# Patient Record
Sex: MALE | Race: WHITE | Employment: OTHER | URBAN - METROPOLITAN AREA
[De-identification: names, ages, dates, MRNs, and addresses within clinical notes are randomized per-mention and may not be internally consistent; named-entity substitution may affect disease eponyms.]

---

## 2017-04-26 PROBLEM — J30.89 SEASONAL ALLERGIC RHINITIS DUE TO OTHER ALLERGIC TRIGGER: Status: ACTIVE | Noted: 2017-04-26

## 2017-04-26 PROBLEM — E66.09 NON MORBID OBESITY DUE TO EXCESS CALORIES: Status: ACTIVE | Noted: 2017-04-26

## 2017-04-26 PROBLEM — N52.9 ERECTILE DYSFUNCTION, UNSPECIFIED ERECTILE DYSFUNCTION TYPE: Status: ACTIVE | Noted: 2017-04-26

## 2017-04-26 PROBLEM — M19.039 ARTHRITIS, WRIST: Status: ACTIVE | Noted: 2017-04-26

## 2017-04-26 PROBLEM — R09.81 NASAL CONGESTION: Status: ACTIVE | Noted: 2017-04-26

## 2017-04-26 PROCEDURE — 86803 HEPATITIS C AB TEST: CPT | Performed by: INTERNAL MEDICINE

## 2017-04-26 PROCEDURE — 81003 URINALYSIS AUTO W/O SCOPE: CPT | Performed by: INTERNAL MEDICINE

## 2017-05-04 PROBLEM — M19.032: Status: ACTIVE | Noted: 2017-05-04

## 2017-05-04 PROBLEM — M19.031: Status: ACTIVE | Noted: 2017-05-04

## 2017-10-25 PROBLEM — Z98.890 H/O BILATERAL INGUINAL HERNIA REPAIR: Status: ACTIVE | Noted: 2017-10-25

## 2017-10-25 PROBLEM — Z87.19 H/O BILATERAL INGUINAL HERNIA REPAIR: Status: ACTIVE | Noted: 2017-10-25

## 2017-10-25 PROBLEM — M19.049 CMC ARTHRITIS: Status: ACTIVE | Noted: 2017-10-25

## 2018-04-26 PROCEDURE — 81003 URINALYSIS AUTO W/O SCOPE: CPT | Performed by: INTERNAL MEDICINE

## 2019-03-28 PROCEDURE — 81001 URINALYSIS AUTO W/SCOPE: CPT | Performed by: INTERNAL MEDICINE

## 2019-03-28 PROCEDURE — 87086 URINE CULTURE/COLONY COUNT: CPT | Performed by: INTERNAL MEDICINE

## 2019-05-06 PROCEDURE — 88305 TISSUE EXAM BY PATHOLOGIST: CPT | Performed by: INTERNAL MEDICINE

## 2020-03-30 PROBLEM — I45.10 RBBB: Status: ACTIVE | Noted: 2020-03-30

## 2020-03-30 PROBLEM — K21.9 GERD (GASTROESOPHAGEAL REFLUX DISEASE): Status: ACTIVE | Noted: 2020-03-30

## 2025-02-28 NOTE — CM/SW NOTE
SW was notified the pt. Has been pre-operatively arranged with [facility].    [facility] will continue to follow.      PARADISE Dougherty ext [phone]

## 2025-03-03 ENCOUNTER — NURSE ONLY (OUTPATIENT)
Dept: LAB | Age: 75
End: 2025-03-03
Attending: ORTHOPAEDIC SURGERY
Payer: MEDICARE

## 2025-03-03 DIAGNOSIS — Z01.818 PREOP TESTING: ICD-10-CM

## 2025-03-03 PROCEDURE — 87641 MR-STAPH DNA AMP PROBE: CPT

## 2025-03-04 LAB — MRSA DNA SPEC QL NAA+PROBE: NEGATIVE

## 2025-03-06 ENCOUNTER — ANESTHESIA EVENT (OUTPATIENT)
Dept: SURGERY | Facility: HOSPITAL | Age: 75
End: 2025-03-06
Payer: MEDICARE

## 2025-03-06 ENCOUNTER — HOSPITAL ENCOUNTER (OUTPATIENT)
Facility: HOSPITAL | Age: 75
Discharge: HOME HEALTH | End: 2025-03-09
Attending: ORTHOPAEDIC SURGERY | Admitting: ORTHOPAEDIC SURGERY
Payer: MEDICARE

## 2025-03-06 ENCOUNTER — ANESTHESIA (OUTPATIENT)
Dept: SURGERY | Facility: HOSPITAL | Age: 75
End: 2025-03-06
Payer: MEDICARE

## 2025-03-06 ENCOUNTER — APPOINTMENT (OUTPATIENT)
Dept: GENERAL RADIOLOGY | Facility: HOSPITAL | Age: 75
End: 2025-03-06
Attending: PHYSICIAN ASSISTANT
Payer: MEDICARE

## 2025-03-06 DIAGNOSIS — Z01.818 PREOP TESTING: Primary | ICD-10-CM

## 2025-03-06 DIAGNOSIS — Z96.651 S/P TOTAL KNEE ARTHROPLASTY, RIGHT: ICD-10-CM

## 2025-03-06 PROCEDURE — 97162 PT EVAL MOD COMPLEX 30 MIN: CPT

## 2025-03-06 PROCEDURE — 0SRC06A REPLACEMENT OF RIGHT KNEE JOINT WITH OXIDIZED ZIRCONIUM ON POLYETHYLENE SYNTHETIC SUBSTITUTE, UNCEMENTED, OPEN APPROACH: ICD-10-PCS | Performed by: ORTHOPAEDIC SURGERY

## 2025-03-06 PROCEDURE — 8E0Y0CZ ROBOTIC ASSISTED PROCEDURE OF LOWER EXTREMITY, OPEN APPROACH: ICD-10-PCS | Performed by: ORTHOPAEDIC SURGERY

## 2025-03-06 PROCEDURE — 73560 X-RAY EXAM OF KNEE 1 OR 2: CPT | Performed by: PHYSICIAN ASSISTANT

## 2025-03-06 PROCEDURE — 88305 TISSUE EXAM BY PATHOLOGIST: CPT | Performed by: ORTHOPAEDIC SURGERY

## 2025-03-06 PROCEDURE — 97165 OT EVAL LOW COMPLEX 30 MIN: CPT

## 2025-03-06 PROCEDURE — 64447 NJX AA&/STRD FEMORAL NRV IMG: CPT | Performed by: ORTHOPAEDIC SURGERY

## 2025-03-06 PROCEDURE — 97535 SELF CARE MNGMENT TRAINING: CPT

## 2025-03-06 PROCEDURE — 97530 THERAPEUTIC ACTIVITIES: CPT

## 2025-03-06 PROCEDURE — 97116 GAIT TRAINING THERAPY: CPT

## 2025-03-06 PROCEDURE — 88311 DECALCIFY TISSUE: CPT | Performed by: ORTHOPAEDIC SURGERY

## 2025-03-06 PROCEDURE — 64415 NJX AA&/STRD BRCH PLXS IMG: CPT | Performed by: ORTHOPAEDIC SURGERY

## 2025-03-06 DEVICE — IMPLANTABLE DEVICE: Type: IMPLANTABLE DEVICE | Site: KNEE | Status: FUNCTIONAL

## 2025-03-06 DEVICE — IMPLANTABLE DEVICE
Type: IMPLANTABLE DEVICE | Site: KNEE | Status: FUNCTIONAL
Brand: PERSONA® THE PERSONALIZED KNEE® OSSEOTI®

## 2025-03-06 DEVICE — IMPLANTABLE DEVICE
Type: IMPLANTABLE DEVICE | Site: KNEE | Status: FUNCTIONAL
Brand: PERSONA® VIVACIT-E®

## 2025-03-06 RX ORDER — ROPIVACAINE HYDROCHLORIDE 5 MG/ML
INJECTION, SOLUTION EPIDURAL; INFILTRATION; PERINEURAL
Status: COMPLETED | OUTPATIENT
Start: 2025-03-06 | End: 2025-03-06

## 2025-03-06 RX ORDER — OXYCODONE HYDROCHLORIDE 5 MG/1
5 TABLET ORAL EVERY 4 HOURS PRN
Status: DISPENSED | OUTPATIENT
Start: 2025-03-06 | End: 2025-03-07

## 2025-03-06 RX ORDER — MIDAZOLAM HYDROCHLORIDE 1 MG/ML
INJECTION INTRAMUSCULAR; INTRAVENOUS
Status: COMPLETED | OUTPATIENT
Start: 2025-03-06 | End: 2025-03-06

## 2025-03-06 RX ORDER — ASPIRIN 81 MG/1
81 TABLET ORAL 2 TIMES DAILY
Status: DISCONTINUED | OUTPATIENT
Start: 2025-03-06 | End: 2025-03-09

## 2025-03-06 RX ORDER — DIPHENHYDRAMINE HYDROCHLORIDE 50 MG/ML
12.5 INJECTION, SOLUTION INTRAMUSCULAR; INTRAVENOUS EVERY 4 HOURS PRN
Status: DISCONTINUED | OUTPATIENT
Start: 2025-03-06 | End: 2025-03-09

## 2025-03-06 RX ORDER — ONDANSETRON 2 MG/ML
INJECTION INTRAMUSCULAR; INTRAVENOUS AS NEEDED
Status: DISCONTINUED | OUTPATIENT
Start: 2025-03-06 | End: 2025-03-06 | Stop reason: SURG

## 2025-03-06 RX ORDER — BUPIVACAINE HYDROCHLORIDE 7.5 MG/ML
INJECTION, SOLUTION INTRASPINAL
Status: COMPLETED | OUTPATIENT
Start: 2025-03-06 | End: 2025-03-06

## 2025-03-06 RX ORDER — MORPHINE SULFATE 2 MG/ML
2 INJECTION, SOLUTION INTRAMUSCULAR; INTRAVENOUS EVERY 2 HOUR PRN
Status: ACTIVE | OUTPATIENT
Start: 2025-03-06 | End: 2025-03-07

## 2025-03-06 RX ORDER — HYDROMORPHONE HYDROCHLORIDE 1 MG/ML
0.6 INJECTION, SOLUTION INTRAMUSCULAR; INTRAVENOUS; SUBCUTANEOUS EVERY 5 MIN PRN
Status: DISCONTINUED | OUTPATIENT
Start: 2025-03-06 | End: 2025-03-06 | Stop reason: HOSPADM

## 2025-03-06 RX ORDER — CETIRIZINE HYDROCHLORIDE 10 MG/1
10 TABLET ORAL DAILY
Status: DISCONTINUED | OUTPATIENT
Start: 2025-03-07 | End: 2025-03-09

## 2025-03-06 RX ORDER — SODIUM PHOSPHATE, DIBASIC AND SODIUM PHOSPHATE, MONOBASIC 7; 19 G/230ML; G/230ML
1 ENEMA RECTAL ONCE AS NEEDED
Status: DISCONTINUED | OUTPATIENT
Start: 2025-03-06 | End: 2025-03-09

## 2025-03-06 RX ORDER — MORPHINE SULFATE 15 MG/1
15 TABLET ORAL EVERY 4 HOURS PRN
Status: ACTIVE | OUTPATIENT
Start: 2025-03-06 | End: 2025-03-07

## 2025-03-06 RX ORDER — DEXAMETHASONE SODIUM PHOSPHATE 4 MG/ML
VIAL (ML) INJECTION AS NEEDED
Status: DISCONTINUED | OUTPATIENT
Start: 2025-03-06 | End: 2025-03-06 | Stop reason: SURG

## 2025-03-06 RX ORDER — TRANEXAMIC ACID 650 MG/1
1300 TABLET ORAL ONCE
Status: COMPLETED | OUTPATIENT
Start: 2025-03-06 | End: 2025-03-06

## 2025-03-06 RX ORDER — MORPHINE SULFATE 4 MG/ML
6 INJECTION, SOLUTION INTRAMUSCULAR; INTRAVENOUS EVERY 2 HOUR PRN
Status: ACTIVE | OUTPATIENT
Start: 2025-03-06 | End: 2025-03-07

## 2025-03-06 RX ORDER — POLYETHYLENE GLYCOL 3350 17 G/17G
17 POWDER, FOR SOLUTION ORAL DAILY PRN
Status: DISCONTINUED | OUTPATIENT
Start: 2025-03-06 | End: 2025-03-09

## 2025-03-06 RX ORDER — EPHEDRINE SULFATE 50 MG/ML
INJECTION INTRAVENOUS AS NEEDED
Status: DISCONTINUED | OUTPATIENT
Start: 2025-03-06 | End: 2025-03-06 | Stop reason: SURG

## 2025-03-06 RX ORDER — HYDROMORPHONE HYDROCHLORIDE 1 MG/ML
0.2 INJECTION, SOLUTION INTRAMUSCULAR; INTRAVENOUS; SUBCUTANEOUS EVERY 5 MIN PRN
Status: DISCONTINUED | OUTPATIENT
Start: 2025-03-06 | End: 2025-03-06 | Stop reason: HOSPADM

## 2025-03-06 RX ORDER — NALOXONE HYDROCHLORIDE 0.4 MG/ML
0.08 INJECTION, SOLUTION INTRAMUSCULAR; INTRAVENOUS; SUBCUTANEOUS AS NEEDED
Status: ACTIVE | OUTPATIENT
Start: 2025-03-06 | End: 2025-03-06

## 2025-03-06 RX ORDER — SODIUM CHLORIDE, SODIUM LACTATE, POTASSIUM CHLORIDE, CALCIUM CHLORIDE 600; 310; 30; 20 MG/100ML; MG/100ML; MG/100ML; MG/100ML
INJECTION, SOLUTION INTRAVENOUS CONTINUOUS
Status: DISCONTINUED | OUTPATIENT
Start: 2025-03-06 | End: 2025-03-07

## 2025-03-06 RX ORDER — ACETAMINOPHEN 500 MG
1000 TABLET ORAL ONCE
Status: COMPLETED | OUTPATIENT
Start: 2025-03-06 | End: 2025-03-06

## 2025-03-06 RX ORDER — BISACODYL 10 MG
10 SUPPOSITORY, RECTAL RECTAL
Status: DISCONTINUED | OUTPATIENT
Start: 2025-03-06 | End: 2025-03-09

## 2025-03-06 RX ORDER — OXYCODONE HYDROCHLORIDE 5 MG/1
10 TABLET ORAL EVERY 4 HOURS PRN
Status: DISPENSED | OUTPATIENT
Start: 2025-03-06 | End: 2025-03-07

## 2025-03-06 RX ORDER — METOCLOPRAMIDE HYDROCHLORIDE 5 MG/ML
10 INJECTION INTRAMUSCULAR; INTRAVENOUS EVERY 8 HOURS PRN
Status: DISCONTINUED | OUTPATIENT
Start: 2025-03-06 | End: 2025-03-09

## 2025-03-06 RX ORDER — DEXAMETHASONE SODIUM PHOSPHATE 10 MG/ML
INJECTION, SOLUTION INTRAMUSCULAR; INTRAVENOUS
Status: COMPLETED | OUTPATIENT
Start: 2025-03-06 | End: 2025-03-06

## 2025-03-06 RX ORDER — MORPHINE SULFATE 4 MG/ML
2 INJECTION, SOLUTION INTRAMUSCULAR; INTRAVENOUS EVERY 10 MIN PRN
Status: DISCONTINUED | OUTPATIENT
Start: 2025-03-06 | End: 2025-03-06 | Stop reason: HOSPADM

## 2025-03-06 RX ORDER — BUPIVACAINE HYDROCHLORIDE 7.5 MG/ML
INJECTION, SOLUTION INTRASPINAL AS NEEDED
Status: DISCONTINUED | OUTPATIENT
Start: 2025-03-06 | End: 2025-03-06 | Stop reason: SURG

## 2025-03-06 RX ORDER — DIPHENHYDRAMINE HYDROCHLORIDE 50 MG/ML
25 INJECTION, SOLUTION INTRAMUSCULAR; INTRAVENOUS ONCE AS NEEDED
Status: ACTIVE | OUTPATIENT
Start: 2025-03-06 | End: 2025-03-06

## 2025-03-06 RX ORDER — DIPHENHYDRAMINE HCL 25 MG
25 CAPSULE ORAL EVERY 4 HOURS PRN
Status: DISCONTINUED | OUTPATIENT
Start: 2025-03-06 | End: 2025-03-09

## 2025-03-06 RX ORDER — HYDROMORPHONE HYDROCHLORIDE 1 MG/ML
0.4 INJECTION, SOLUTION INTRAMUSCULAR; INTRAVENOUS; SUBCUTANEOUS EVERY 5 MIN PRN
Status: DISCONTINUED | OUTPATIENT
Start: 2025-03-06 | End: 2025-03-06 | Stop reason: HOSPADM

## 2025-03-06 RX ORDER — SENNOSIDES 8.6 MG
17.2 TABLET ORAL NIGHTLY
Status: DISCONTINUED | OUTPATIENT
Start: 2025-03-06 | End: 2025-03-09

## 2025-03-06 RX ORDER — FAMOTIDINE 20 MG/1
20 TABLET, FILM COATED ORAL 2 TIMES DAILY
Status: DISCONTINUED | OUTPATIENT
Start: 2025-03-06 | End: 2025-03-09

## 2025-03-06 RX ORDER — MORPHINE SULFATE 4 MG/ML
4 INJECTION, SOLUTION INTRAMUSCULAR; INTRAVENOUS EVERY 2 HOUR PRN
Status: ACTIVE | OUTPATIENT
Start: 2025-03-06 | End: 2025-03-07

## 2025-03-06 RX ORDER — ACETAMINOPHEN 500 MG
1000 TABLET ORAL EVERY 8 HOURS
Status: DISPENSED | OUTPATIENT
Start: 2025-03-06 | End: 2025-03-07

## 2025-03-06 RX ORDER — LIDOCAINE HYDROCHLORIDE 10 MG/ML
INJECTION, SOLUTION EPIDURAL; INFILTRATION; INTRACAUDAL; PERINEURAL AS NEEDED
Status: DISCONTINUED | OUTPATIENT
Start: 2025-03-06 | End: 2025-03-06 | Stop reason: SURG

## 2025-03-06 RX ORDER — MIDAZOLAM HYDROCHLORIDE 1 MG/ML
INJECTION INTRAMUSCULAR; INTRAVENOUS AS NEEDED
Status: DISCONTINUED | OUTPATIENT
Start: 2025-03-06 | End: 2025-03-06 | Stop reason: SURG

## 2025-03-06 RX ORDER — DOCUSATE SODIUM 100 MG/1
100 CAPSULE, LIQUID FILLED ORAL 2 TIMES DAILY
Status: DISCONTINUED | OUTPATIENT
Start: 2025-03-06 | End: 2025-03-09

## 2025-03-06 RX ORDER — ONDANSETRON 2 MG/ML
4 INJECTION INTRAMUSCULAR; INTRAVENOUS EVERY 6 HOURS PRN
Status: DISCONTINUED | OUTPATIENT
Start: 2025-03-06 | End: 2025-03-09

## 2025-03-06 RX ORDER — MORPHINE SULFATE 10 MG/ML
6 INJECTION, SOLUTION INTRAMUSCULAR; INTRAVENOUS EVERY 10 MIN PRN
Status: DISCONTINUED | OUTPATIENT
Start: 2025-03-06 | End: 2025-03-06 | Stop reason: HOSPADM

## 2025-03-06 RX ORDER — LIDOCAINE HYDROCHLORIDE 10 MG/ML
INJECTION, SOLUTION INFILTRATION; PERINEURAL
Status: COMPLETED | OUTPATIENT
Start: 2025-03-06 | End: 2025-03-06

## 2025-03-06 RX ORDER — HYDROCODONE BITARTRATE AND ACETAMINOPHEN 10; 325 MG/1; MG/1
1 TABLET ORAL EVERY 6 HOURS PRN
Status: DISCONTINUED | OUTPATIENT
Start: 2025-03-06 | End: 2025-03-09

## 2025-03-06 RX ORDER — FAMOTIDINE 10 MG/ML
20 INJECTION, SOLUTION INTRAVENOUS 2 TIMES DAILY
Status: DISCONTINUED | OUTPATIENT
Start: 2025-03-06 | End: 2025-03-09

## 2025-03-06 RX ORDER — MORPHINE SULFATE 4 MG/ML
4 INJECTION, SOLUTION INTRAMUSCULAR; INTRAVENOUS EVERY 10 MIN PRN
Status: DISCONTINUED | OUTPATIENT
Start: 2025-03-06 | End: 2025-03-06 | Stop reason: HOSPADM

## 2025-03-06 RX ORDER — FLUTICASONE PROPIONATE 50 MCG
2 SPRAY, SUSPENSION (ML) NASAL DAILY
Status: DISCONTINUED | OUTPATIENT
Start: 2025-03-07 | End: 2025-03-09

## 2025-03-06 RX ADMIN — ROPIVACAINE HYDROCHLORIDE 30 ML: 5 INJECTION, SOLUTION EPIDURAL; INFILTRATION; PERINEURAL at 06:59:00

## 2025-03-06 RX ADMIN — EPHEDRINE SULFATE 10 MG: 50 INJECTION INTRAVENOUS at 08:16:00

## 2025-03-06 RX ADMIN — DEXAMETHASONE SODIUM PHOSPHATE 4 MG: 4 MG/ML VIAL (ML) INJECTION at 07:52:00

## 2025-03-06 RX ADMIN — MIDAZOLAM HYDROCHLORIDE 1 MG: 1 INJECTION INTRAMUSCULAR; INTRAVENOUS at 07:17:00

## 2025-03-06 RX ADMIN — DEXAMETHASONE SODIUM PHOSPHATE 10 MG: 10 INJECTION, SOLUTION INTRAMUSCULAR; INTRAVENOUS at 06:59:00

## 2025-03-06 RX ADMIN — BUPIVACAINE HYDROCHLORIDE 1.5 ML: 7.5 INJECTION, SOLUTION INTRASPINAL at 07:15:00

## 2025-03-06 RX ADMIN — ONDANSETRON 4 MG: 2 INJECTION INTRAMUSCULAR; INTRAVENOUS at 07:39:00

## 2025-03-06 RX ADMIN — SODIUM CHLORIDE, SODIUM LACTATE, POTASSIUM CHLORIDE, CALCIUM CHLORIDE: 600; 310; 30; 20 INJECTION, SOLUTION INTRAVENOUS at 07:55:00

## 2025-03-06 RX ADMIN — SODIUM CHLORIDE, SODIUM LACTATE, POTASSIUM CHLORIDE, CALCIUM CHLORIDE: 600; 310; 30; 20 INJECTION, SOLUTION INTRAVENOUS at 08:43:00

## 2025-03-06 RX ADMIN — LIDOCAINE HYDROCHLORIDE 5 ML: 10 INJECTION, SOLUTION INFILTRATION; PERINEURAL at 07:15:00

## 2025-03-06 RX ADMIN — SODIUM CHLORIDE, SODIUM LACTATE, POTASSIUM CHLORIDE, CALCIUM CHLORIDE: 600; 310; 30; 20 INJECTION, SOLUTION INTRAVENOUS at 07:56:00

## 2025-03-06 RX ADMIN — MIDAZOLAM HYDROCHLORIDE 2 MG: 1 INJECTION INTRAMUSCULAR; INTRAVENOUS at 06:59:00

## 2025-03-06 RX ADMIN — LIDOCAINE HYDROCHLORIDE 25 MG: 10 INJECTION, SOLUTION EPIDURAL; INFILTRATION; INTRACAUDAL; PERINEURAL at 07:39:00

## 2025-03-06 RX ADMIN — MIDAZOLAM HYDROCHLORIDE 1 MG: 1 INJECTION INTRAMUSCULAR; INTRAVENOUS at 07:15:00

## 2025-03-06 NOTE — HOME CARE LIAISON
This patient was set up pre-operatively with [facility]. Met with pt and wife, all questions answered. [facility] will follow at [place] for RN/PT. TERRI Cori aware.

## 2025-03-06 NOTE — ANESTHESIA PREPROCEDURE EVALUATION
Anesthesia PreOp Note    HPI:     Luis Antonio Pavon is a 74 year old male who presents for preoperative consultation requested by: Angel Smith MD    Date of Surgery: 3/6/2025    Procedure(s):  Right total knee arthroplasty  Indication: Primary osteoarthritis of right knee    Relevant Problems   No relevant active problems       NPO:  Last Liquid Consumption Date: 03/05/25  Last Liquid Consumption Time: 2200  Last Solid Consumption Date: 03/05/25  Last Solid Consumption Time: 1800  Last Liquid Consumption Date: 03/05/25          History Review:  Patient Active Problem List    Diagnosis Date Noted    GERD (gastroesophageal reflux disease) 03/30/2020    RBBB 03/30/2020    CMC arthritis 10/25/2017    Osteoarthritis of both wrists, unspecified osteoarthritis type 05/04/2017    Nasal congestion 04/26/2017    Erectile dysfunction, unspecified erectile dysfunction type 04/26/2017    Seasonal allergic rhinitis due to other allergic trigger 04/26/2017    Non morbid obesity due to excess calories 04/26/2017    Hx of colonic polyp 04/25/2016    Nocturia 04/07/2015    Low HDL (under 40) 03/27/2014       Past Medical History:    Cancer (HCC)    skin cancer    Chondromalacia of patella    Log Date: 02/18/2013     Colon polyp    Dislocation of shoulder    bilat mult times, sees ortho prn    ED (erectile dysfunction)    GERD (gastroesophageal reflux disease)    uses tums 2-3 times weekly, fermin after acidic foods. no trouble swallowing    Hematuria    work up by Dr. Caraballo negative    Hemorrhoids    better with surgery    History of bilateral inguinal hernia repair    saw Sapna, surgery fall 2016    Living will in place    Low HDL (under 40)    Nasal congestion    saw ENT    Need for hepatitis C screening test    neg    Normal eye exam    at [facility], will go 2021    Osteoarthritis    RBBB    new from 2010, asyptomatic, no further w/u indicated,  discussed with pt ( was nl 2/22/10)    Screening for cardiovascular condition    normal  CGXT with EF of 52%    Screening for skin condition    Dr. Sena, sees yearly       Past Surgical History:   Procedure Laterality Date    Cataract      Colonoscopy  2014    Procedure: COLONOSCOPY, POSSIBLE BIOPSY, POSSIBLE POLYPECTOMY 19839;  Surgeon: Nathan Gibbs MD;  Location: [facility]    Colonoscopy N/A 2019    Procedure: COLONOSCOPY, POSSIBLE BIOPSY, POSSIBLE POLYPECTOMY 04229;  Surgeon: Nathan Gibbs MD;  Location: [facility]    Colonoscopy,diagnostic  , , , 14    neg Dr. Gibbs, recheck  due to hx    Hemorrhoidectomy,int/ext,complx  2009    Performed by CHUCK ALEJO at [facility]    Hernia surgery Bilateral 2016    Dr. Alejo    Other surgical history Left 1971    shoulder surg    Repair ing hernia,4+y/o,reducibl Left 1965    L inguinal    Repair of nasal septum  ,     Tonsillectomy      Umbilical hernia repair  2019    Dr. Alejo       Prescriptions Prior to Admission[1]  Current Medications and Prescriptions Ordered in Epic[2]    Allergies[3]    Family History   Problem Relation Age of Onset    Heart Disorder Father         cardiomyopathy? ICD    Heart Disorder Brother         murmur    Psychiatric Mother         memory issues    No Known Problems Daughter     No Known Problems Daughter     Cancer Daughter 30        melanoma    No Known Problems Brother      Social History     Socioeconomic History    Marital status:     Number of children: 3   Occupational History    Occupation: health care consultant,retired   Tobacco Use    Smoking status: Former     Current packs/day: 0.00     Average packs/day: 0.8 packs/day for 10.0 years (7.5 ttl pk-yrs)     Types: Cigarettes     Start date: 1975     Quit date: 1985     Years since quittin.2    Smokeless tobacco: Never   Vaping Use    Vaping status: Never Used   Substance and Sexual Activity    Alcohol use: Yes     Alcohol/week: 2.5 standard  drinks of alcohol     Types: 3 Standard drinks or equivalent per week     Comment: 3 x weekly    Drug use: No    Sexual activity: Not Currently     Partners: Female   Other Topics Concern     Service No    Blood Transfusions No    Exercise Yes     Comment: walking due to covid 4/12/21    Seat Belt Yes    Self-Exams Yes       Available pre-op labs reviewed.             Vital Signs:  Body mass index is 29.66 kg/m².   height is 1.88 m (6' 2\") and weight is 104.8 kg (231 lb). His oral temperature is 97.8 °F (36.6 °C). His blood pressure is 132/80 and his pulse is 76. His respiration is 16 and oxygen saturation is 94%.   Vitals:    02/28/25 1151 03/06/25 0604   BP:  132/80   Pulse:  76   Resp:  16   Temp:  97.8 °F (36.6 °C)   TempSrc:  Oral   SpO2:  94%   Weight: 103 kg (227 lb) 104.8 kg (231 lb)   Height: 1.88 m (6' 2\")         Anesthesia Evaluation     Patient summary reviewed and Nursing notes reviewed    No history of anesthetic complications   Airway   Mallampati: II  TM distance: >3 FB  Neck ROM: full  Dental - Dentition appears grossly intact     Pulmonary - negative ROS and normal exam   Cardiovascular - negative ROS and normal exam    Neuro/Psych - negative ROS     GI/Hepatic/Renal    (+) GERD    Endo/Other - negative ROS   Abdominal                  Anesthesia Plan:   ASA:  2  Plan:   Spinal and regional  Post-op Pain Management: Saphenous block  Plan Comments: , no blood thinners -- proceed adductor/plain spinal  Informed Consent Plan and Risks Discussed With:  Patient  Discussed plan with:  CRNA      I have informed Luis Antonio PULIDO Jonny and/or legal guardian or family member of the nature of the anesthetic plan, benefits, risks including possible dental damage if relevant, major complications, and any alternative forms of anesthetic management.   All of the patient's questions were answered to the best of my ability. The patient desires the anesthetic management as planned.  Ganga Rocha,  MD  3/6/2025 6:36 AM  Present on Admission:  **None**           [1]   Medications Prior to Admission   Medication Sig Dispense Refill Last Dose/Taking    Cholecalciferol (VITAMIN D) 50 MCG (2000 UT) Oral Cap    Past Week    Saccharomyces boulardii (PROBIOTIC) 250 MG Oral Cap    Past Week    Polypodium Leucotomos (HELIOCARE OR) Take 1 tablet by mouth daily.   Past Week    Fluticasone Propionate (FLONASE) 50 MCG/ACT Nasal Suspension SPRAY 2 SPRAYS IN EACH NOSTRIL EVERY DAY 48 g 3 3/5/2025    Multiple Vitamins-Minerals (OCUVITE ADULT 50+ OR) Take  by mouth daily.   Past Week    Cetirizine HCl (ZYRTEC) 10 MG Oral Tab 1 TABLET DAILY   3/4/2025    COSAMIN -400 MG OR CAPS 2 capsules daily   Past Week    ketoconazole 2 % External Cream Apply to rash between toes BID 60 g 3    [2]   Current Facility-Administered Medications Ordered in Epic   Medication Dose Route Frequency Provider Last Rate Last Admin    lactated ringers infusion   Intravenous Continuous Angel Smith MD        ceFAZolin (Ancef) 2g in 10mL IV syringe premix  2 g Intravenous Once Angel Smith MD        clonidine-EPINEPHrine-ropivacaine-ketorolac (CERTS) (Duraclon-Adrenalin-Naropin-Toradol) pain cocktail irrigation   Intra-articular Once (Intra-Op) Angel Smith MD         No current [facility]-ordered outpatient medications on file.   [3] No Known Allergies

## 2025-03-06 NOTE — ANESTHESIA PROCEDURE NOTES
Peripheral Block    Date/Time: 3/6/2025 6:59 AM    Performed by: Ganga Rocha MD  Authorized by: Ganga Rocha MD      General Information and Staff    Start Time:  3/6/2025 6:59 AM  End Time:  3/6/2025 7:03 AM  Anesthesiologist:  Ganga Rocha MD  Performed by:  Anesthesiologist  Patient Location:  PACU    Block Placement: Pre Induction  Site Identification: real time ultrasound guided and image stored and retrievable    Block site/laterality marked before start: site marked  Reason for Block: at surgeon's request and post-op pain management    Preanesthetic Checklist: 2 patient identifers, IV checked, risks and benefits discussed, monitors and equipment checked, pre-op evaluation, timeout performed, anesthesia consent, sterile technique used, no prohibitive neurological deficits and no local skin infection at insertion site      Procedure Details    Patient Position:  Supine  Prep: ChloraPrep    Monitoring:  Heart rate, cardiac monitor, continuous pulse ox and blood pressure cuff  Block Type:  Adductor canal  Laterality:  Right  Injection Technique:  Single-shot    Needle    Needle Type:  Short-bevel and echogenic  Needle Gauge:  21 G  Needle Length:  90 mm  Needle Localization:  Ultrasound guidance  Reason for Ultrasound Use: appropriate spread of the medication was noted in real time and no ultrasound evidence of intravascular and/or intraneural injection            Assessment    Injection Assessment:  Good spread noted, incremental injection, low pressure, negative aspiration for heme, negative resistance and no pain on injection  - Patient tolerated block procedure well without evidence of immediate block related complications.     Medications  3/6/2025 6:59 AM  midazolam (VERSED)injection 2mg/2ml - Intravenous   2 mg - 3/6/2025 6:59:00 AM  ropivacaine (NAROPIN) injection 0.5% - Infiltration   30 mL - 3/6/2025 6:59:00 AM  dexamethasone (DECADRON) PF injection 10 mg/ml - Injection   10  mg - 3/6/2025 6:59:00 AM    Additional Comments    Good image, atraumatic

## 2025-03-06 NOTE — DISCHARGE INSTRUCTIONS
Home Health:  Sometimes managing your health at home requires assistance.  The [facility] team has recognized your preference to use [facility].  They can be reached by phone at [phone].  The fax number for your reference is [phone].  A representative from the home health agency will contact you or your family to schedule your first visit.        Keep dressing intact for 1 week, changing only if >50% saturated  Change dressing in 1 week if not previously changed  May shower with water-proof dressing intact  Keep leg elevated when not ambulating, no pillow directly under knee, keep leg straight with foot higher than knee.  Use pain medication as provided  Use over the counter stool softener daily while taking pain medication - Colace 100mg twice a day AND Senokot 17.2mg every night. If no bowel movement in 2 days, obtain Magnesium Citrate and take as directed.  Start 81mg Aspirin tonight with dinner. Continue 81mg twice a day for 6 weeks.  Contact number provided in 24 hours if you have not heard from home health services for RN and physical therapy home visits  Follow-up in office in 2 weeks as scheduled       Sometimes managing your health at home requires assistance.  The [facility] team has recognized your preference to use [facility].  They can be reached by phone at [phone].  The fax number for your reference is [phone].  A representative from the home health agency will contact you or your family to schedule your first visit.          HOME INSTRUCTIONS  AMBS[facility]G HOME CARE INSTRUCTIONS: POST-OP ANESTHESIA  The medication that you received for sedation or general anesthesia can last up to 24 hours. Your judgment and reflexes may be altered, even if you feel like your normal self.      We Recommend:   Do not drive any motor vehicle or bicycle   Avoid mowing the lawn, playing sports, or working with power tools/applicances (power saws,  electric knives or mixers)   That you have someone stay with you on your first night home   Do not drink alcohol or take sleeping pills or tranquilizers   Do not sign legal documents within 24 hours of your procedure   If you had a nerve block for your surgery, take extra care not to put any pressure on your arm or hand for 24 hours    It is normal:  For you to have a sore throat if you had a breathing tube during surgery (while you were asleep!). The sore throat should get better within 48 hours. You can gargle with warm salt water (1/2 tsp in 4 oz warm water) or use a throat lozenge for comfort  To feel muscle aches or soreness especially in the abdomen, chest or neck. The achy feeling should go away in the next 24 hours  To feel weak, sleepy or \"wiped out\". Your should start feeling better in the next 24 hours.   To experience mild discomforts such as sore lip or tongue, headache, cramps, gas pains or a bloated feeling in your abdomen.   To experience mild back pain or soreness for a day or two if you had spinal or epidural anesthesia.   If you had laparoscopic surgery, to feel shoulder pain or discomfort on the day of surgery.   For some patients to have nausea after surgery/anesthesia    If you feel nausea or experience vomiting:   Try to move around less.   Eat less than usual or drink only liquids until the next morning   Nausea should resolve in about 24 hours    If you have a problem when you are at home:    Call your surgeons office   Discharge Instructions: After Your Surgery  You’ve just had surgery. During surgery, you were given medicine called anesthesia to keep you relaxed and free of pain. After surgery, you may have some pain or nausea. This is common. Here are some tips for feeling better and getting well after surgery.   Going home  Your healthcare provider will show you how to take care of yourself when you go home. They'll also answer your questions. Have an adult family member or friend drive  you home. For the first 24 hours after your surgery:   Don't drive or use heavy equipment.  Don't make important decisions or sign legal papers.  Take medicines as directed.  Don't drink alcohol.  Have someone stay with you, if needed. They can watch for problems and help keep you safe.  Be sure to go to all follow-up visits with your healthcare provider. And rest after your surgery for as long as your provider tells you to.   Coping with pain  If you have pain after surgery, pain medicine will help you feel better. Take it as directed, before pain becomes severe. Also, ask your healthcare provider or pharmacist about other ways to control pain. This might be with heat, ice, or relaxation. And follow any other instructions your surgeon or nurse gives you.      Stay on schedule with your medicine.     Tips for taking pain medicine  To get the best relief possible, remember these points:   Pain medicines can upset your stomach. Taking them with a little food may help.  Most pain relievers taken by mouth need at least 20 to 30 minutes to start to work.  Don't wait till your pain becomes severe before you take your medicine. Try to time your medicine so that you can take it before starting an activity. This might be before you get dressed, go for a walk, or sit down for dinner.  Constipation is a common side effect of some pain medicines. Call your healthcare provider before taking any medicines such as laxatives or stool softeners to help ease constipation. Also ask if you should skip any foods. Drinking lots of fluids and eating foods such as fruits and vegetables that are high in fiber can also help. Remember, don't take laxatives unless your surgeon has prescribed them.  Drinking alcohol and taking pain medicine can cause dizziness and slow your breathing. It can even be deadly. Don't drink alcohol while taking pain medicine.  Pain medicine can make you react more slowly to things. Don't drive or run machinery while  taking pain medicine.  Your healthcare provider may tell you to take acetaminophen to help ease your pain. Ask them how much you're supposed to take each day. Acetaminophen or other pain relievers may interact with your prescription medicines or other over-the-counter (OTC) medicines. Some prescription medicines have acetaminophen and other ingredients in them. Using both prescription and OTC acetaminophen for pain can cause you to accidentally overdose. Read the labels on your OTC medicines with care. This will help you to clearly know the list of ingredients, how much to take, and any warnings. It may also help you not take too much acetaminophen. If you have questions or don't understand the information, ask your pharmacist or healthcare provider to explain it to you before you take the OTC medicine.   Managing nausea  Some people have an upset stomach (nausea) after surgery. This is often because of anesthesia, pain, or pain medicine, less movement of food in the stomach, or the stress of surgery. These tips will help you handle nausea and eat healthy foods as you get better. If you were on a special food plan before surgery, ask your healthcare provider if you should follow it while you get better. Check with your provider on how your eating should progress. It may depend on the surgery you had. These general tips may help:   Don't push yourself to eat. Your body will tell you when to eat and how much.  Start off with clear liquids and soup. They're easier to digest.  Next try semi-solid foods as you feel ready. These include mashed potatoes, applesauce, and gelatin.  Slowly move to solid foods. Don’t eat fatty, rich, or spicy foods at first.  Don't force yourself to have 3 large meals a day. Instead eat smaller amounts more often.  Take pain medicines with a small amount of solid food, such as crackers or toast. This helps prevent nausea.  When to call your healthcare provider  Call your healthcare provider  right away if you have any of these:   You still have too much pain, or the pain gets worse, after taking the medicine. The medicine may not be strong enough. Or there may be a complication from the surgery.  You feel too sleepy, dizzy, or groggy. The medicine may be too strong.  Side effects such as nausea or vomiting. Your healthcare provider may advise taking other medicines to .  Skin changes such as rash, itching, or hives. This may mean you have an allergic reaction. Your provider may advise taking other medicines.  The incision looks different (for instance, part of it opens up).  Bleeding or fluid leaking from the incision site, and weren't told to expect that.  Fever of 100.4°F (38°C) or higher, or as directed by your provider.  Call 911  Call 911 right away if you have:   Trouble breathing  Facial swelling    If you have obstructive sleep apnea   You were given anesthesia medicine during surgery to keep you comfortable and free of pain. After surgery, you may have more apnea spells because of this medicine and other medicines you were given. The spells may last longer than normal.    At home:  Keep using the continuous positive airway pressure (CPAP) device when you sleep. Unless your healthcare provider tells you not to, use it when you sleep, day or night. CPAP is a common device used to treat obstructive sleep apnea.  Talk with your provider before taking any pain medicine, muscle relaxants, or sedatives. Your provider will tell you about the possible dangers of taking these medicines.  Contact your provider if your sleeping changes a lot even when taking medicines as directed.  i[employer] last reviewed this educational content on 10/1/2021  © 9959-7618 [employer]. All rights reserved. This information is not intended as a substitute for professional medical care. Always follow your healthcare professional's instructions.

## 2025-03-06 NOTE — H&P
History & Physical Examination    Patient Name: Luis Antonio Pavon  MRN: [id number]  CSN: [id number]  YOB: 1950    Diagnosis: RIGHT KNEE OA    Present Illness: FAILED CONSERVATIVE MEASURES    Prescriptions Prior to Admission[1]  Current Facility-Administered Medications   Medication Dose Route Frequency    lactated ringers infusion   Intravenous Continuous    ceFAZolin (Ancef) 2g in 10mL IV syringe premix  2 g Intravenous Once    clonidine-EPINEPHrine-ropivacaine-ketorolac (CERTS) (Duraclon-Adrenalin-Naropin-Toradol) pain cocktail irrigation   Intra-articular Once (Intra-Op)       Allergies: Allergies[2]    Past Medical History:    Cancer (HCC)    skin cancer    Chondromalacia of patella    Log Date: 02/18/2013     Colon polyp    Dislocation of shoulder    bilat mult times, sees ortho prn    ED (erectile dysfunction)    GERD (gastroesophageal reflux disease)    uses tums 2-3 times weekly, fermin after acidic foods. no trouble swallowing    Hematuria    work up by Dr. Caraballo negative    Hemorrhoids    better with surgery    History of bilateral inguinal hernia repair    saw Sapna, surgery fall 2016    Living will in place    Low HDL (under 40)    Nasal congestion    saw ENT    Need for hepatitis C screening test    neg    Normal eye exam    at [facility], will go 2021    Osteoarthritis    RBBB    new from 2010, asyptomatic, no further w/u indicated,  discussed with pt ( was nl 2/22/10)    Screening for cardiovascular condition    normal CGXT with EF of 52%    Screening for skin condition    Dr. Sena, sees yearly     Past Surgical History:   Procedure Laterality Date    Cataract      Colonoscopy  05/05/2014    Procedure: COLONOSCOPY, POSSIBLE BIOPSY, POSSIBLE POLYPECTOMY 85579;  Surgeon: Nathan Gibbs MD;  Location: [facility]    Colonoscopy N/A 05/06/2019    Procedure: COLONOSCOPY, POSSIBLE BIOPSY, POSSIBLE POLYPECTOMY 51965;  Surgeon: Nathan Gibbs MD;  Location: [facility]     Colonoscopy,diagnostic  , , , 14    neg Dr. Gibbs, recheck 2019 due to hx    Hemorrhoidectomy,int/ext,complx  2009    Performed by CHUCK ALEJO at [facility] [place]    Hernia surgery Bilateral 2016    Dr. Alejo    Other surgical history Left 1971    shoulder surg    Repair ing hernia,4+y/o,reducibl Left 1965    L inguinal    Repair of nasal septum  ,     Tonsillectomy      Umbilical hernia repair  2019    Dr. Alejo     Family History   Problem Relation Age of Onset    Heart Disorder Father         cardiomyopathy? ICD    Heart Disorder Brother         murmur    Psychiatric Mother         memory issues    No Known Problems Daughter     No Known Problems Daughter     Cancer Daughter 30        melanoma    No Known Problems Brother      Social History     Tobacco Use    Smoking status: Former     Current packs/day: 0.00     Average packs/day: 0.8 packs/day for 10.0 years (7.5 ttl pk-yrs)     Types: Cigarettes     Start date: 1975     Quit date: 1985     Years since quittin.2    Smokeless tobacco: Never   Substance Use Topics    Alcohol use: Yes     Alcohol/week: 2.5 standard drinks of alcohol     Types: 3 Standard drinks or equivalent per week     Comment: 3 x weekly       SYSTEM Check if Review is Normal Check if Physical Exam is Normal If not normal, please explain:   HEENT [ x] [ x]    NECK & BACK [ x] [ x]    HEART [ x] [ x]    LUNGS [ x] [ x]    ABDOMEN [ x] [ x]    UROGENITAL [ x] [ x]    EXTREMITIES [ ] [ ] R +varus, +effusion   OTHER        [ x ] I have discussed the risks and benefits and alternatives with the patient/family.  They understand and agree to proceed with plan of care.  [ x ] I have reviewed the History and Physical done within the last 30 days.  Any changes noted above.    PLAN: RIGHT TKA    Angel Smith MD  3/6/2025  6:53 AM           [1]   Medications Prior to Admission   Medication Sig Dispense Refill Last Dose/Taking     Cholecalciferol (VITAMIN D) 50 MCG (2000 UT) Oral Cap    Past Week    Saccharomyces boulardii (PROBIOTIC) 250 MG Oral Cap    Past Week    Polypodium Leucotomos (HELIOCARE OR) Take 1 tablet by mouth daily.   Past Week    Fluticasone Propionate (FLONASE) 50 MCG/ACT Nasal Suspension SPRAY 2 SPRAYS IN EACH NOSTRIL EVERY DAY 48 g 3 3/5/2025    Multiple Vitamins-Minerals (OCUVITE ADULT 50+ OR) Take  by mouth daily.   Past Week    Cetirizine HCl (ZYRTEC) 10 MG Oral Tab 1 TABLET DAILY   3/4/2025    COSAMIN -400 MG OR CAPS 2 capsules daily   Past Week    ketoconazole 2 % External Cream Apply to rash between toes BID 60 g 3    [2] No Known Allergies

## 2025-03-06 NOTE — PHYSICAL THERAPY NOTE
PHYSICAL THERAPY KNEE EVALUATION - INPATIENT      Room Number: [facility]  Evaluation Date: 3/6/2025  Type of Evaluation: Initial  Physician Order: PT Eval and Treat    Presenting Problem: s/p R TKA  Co-Morbidities :  Cancer (HCC)    skin cancer   Chondromalacia of patella    Log Date: 02/18/2013    Colon polyp   Dislocation of shoulder    bilat mult times, sees ortho prn   ED (erectile dysfunction)   GERD (gastroesophageal reflux disease)    uses tums 2-3 times weekly, fermin after acidic foods. no trouble swallowing   Hematuria    work up by Dr. Caraballo negative   Hemorrhoids    better with surgery   History of bilateral inguinal hernia repair    saw Sapna, surgery fall 2016   Living will in place   Low HDL (under 40)   Nasal congestion    saw ENT   Need for hepatitis C screening test    neg   Normal eye exam    at [facility], will go 2021   Osteoarthritis   RBBB    new from 2010, asyptomatic, no further w/u indicated,  discussed with pt ( was nl 2/22/10)   Screening for cardiovascular condition    normal CGXT with EF of 52%   Screening for skin condition    Dr. Sena, sees yearly  Reason for Therapy: Mobility Dysfunction and Discharge Planning    PHYSICAL THERAPY ASSESSMENT   Patient is a 74 year old male admitted 3/6/2025 for s/p R TKA.  Prior to admission, patient's baseline is independent.  Patient is currently functioning below baseline with bed mobility, transfers, gait, stair negotiation, maintaining seated position, standing prolonged periods, and performing household tasks.  Patient is requiring contact guard assist as a result of the following impairments: pain and medical status.  Physical Therapy will continue to follow for duration of hospitalization.    Patient will benefit from continued skilled PT Services at discharge to promote prior level of function and safety with additional support and return home with home health PT.    PLAN DURING HOSPITALIZATION  Nursing Mobility Recommendation : 1 Assist      PT Treatment Plan: Bed mobility, Body mechanics, Coordination, Endurance, Energy conservation, Patient education, Family education, Gait training, Neuromuscular re-educate, Range of motion, Strengthening, Stair training, Balance training  Rehab Potential : Good  Frequency (Obs): Daily     PHYSICAL THERAPY MEDICAL/SOCIAL HISTORY   History related to current admission: Luis Antonio Pavon is a 74 year old male who presents for preoperative consultation requested by: Angel Smith MD      Problem List  Active Problems:    * No active hospital problems. *    HOME SITUATION  Type of Home: House  Home Layout: Two level (may be able to make living on first level work)  Stairs to Enter : 2   Railing: No    Stairs to Bedroom: 14    Railing: Yes    Lives With: Spouse (able to help when needed)    Drives: Yes   Patient Regularly Uses: None (has RW and cane available)      Prior Level of [place]: Prior to admission patient was independent with performance of all ADL's and performance of functional mobility.    SUBJECTIVE  Agreeable to PT    PHYSICAL THERAPY EXAMINATION   OBJECTIVE  Precautions: Bed/chair alarm  Fall Risk: Standard fall risk    WEIGHT BEARING RESTRICTION  R Lower Extremity: Weight Bearing as Tolerated    PAIN ASSESSMENT  Ratin (at rest, 8 with movement)  Location: surgical site  Management Techniques: Activity promotion, Breathing techniques, Body mechanics, Relaxation, Repositioning    COGNITION  Overall Cognitive Status:  WFL - within functional limits  Arousal/Alertness:  appropriate responses to stimuli  Initiation: appears intact    RANGE OF MOTION AND STRENGTH ASSESSMENT  Upper extremity ROM and strength are within functional limits BUEs  Lower extremity ROM is within functional limits BLEs  Lower extremity strength is within functional limits BLEs, grossly 4/5    BALANCE  Static Sitting: Good  Dynamic Sitting: Fair +  Static Standing: Fair +  Dynamic Standing: Fair                                                                        ADDITIONAL TESTS  Knee ROM: R knee flexion, L knee flexion, R knee extension, L knee extension  R Knee Flexion (degrees): 90  L Knee Flexion (degrees):  (WNL)  R Knee Extension (degrees): -7  L Knee Extension (degrees):  (WNL)    AM-PAC '6-Clicks' INPATIENT SHORT FORM - BASIC MOBILITY  How much difficulty does the patient currently have...  Patient Difficulty: Turning over in bed (including adjusting bedclothes, sheets and blankets)?: A Little   Patient Difficulty: Sitting down on and standing up from a chair with arms (e.g., wheelchair, bedside commode, etc.): A Little   Patient Difficulty: Moving from lying on back to sitting on the side of the bed?: A Little   How much help from another person does the patient currently need...   Help from Another: Moving to and from a bed to a chair (including a wheelchair)?: A Little   Help from Another: Need to walk in hospital room?: A Little   Help from Another: Climbing 3-5 steps with a railing?: A Little     AM-PAC Score:  Raw Score: 18   Approx Degree of Impairment: 46.58%   Standardized Score (AM-PAC Scale): 43.63   CMS Modifier (G-Code): CK     FUNCTIONAL ABILITY STATUS  Functional Mobility/Gait Assessment  Gait Assistance: Contact guard assist  Distance (ft): 130  Assistive Device: Rolling walker  Pattern:  (decreased step length, decreased R stance time, UE support on RW, decreased speed)  Stairs: Stairs  How Many Stairs: 8 (4x2)  Device: 2 Rails  Assist: Contact guard assist  Pattern: Ascend and Descend  Ascend and Descend : Step to  Rolling: contact guard assist  Supine to Sit: contact guard assist  Sit to Supine: not tested  Sit to Stand: contact guard assist    Exercise/Education Provided:Education Provided To: Patient, Family/Caregiver Patient Education: Role of Physical Therapy, Plan of Care, Discharge Recommendations, DME Recommendations, Functional Transfer Techniques, Fall Prevention, Weight Bear Status, Surgical  Precautions, Posture/Positioning, Edema Reduction, Energy Conservation, Proper Body Mechanics, Gait Training, Stair Training Patient's Response to Education: Verbalized Understanding     Skilled Therapy Provided: Patient received supine in bed with family present at initiation of session agreeable to participation in PT. Patient tolerates treatment well. Bed mobility performed with CGA. Pt requires CGA to perform STS with RW. Ambulates 130ft with RW and CGA. Negotiated 4x2 steps with two rails and CGA. Pt reports increase in pain with movement that returns to baseline with rest. Patient educated on gait safety, stair negotiation, POC and DC recs, verbalized understanding. Patient left EOB sitting with OT and family present, lines intact, needs in reach and handoff to RN.    The patient's Approx Degree of Impairment: 46.58% has been calculated based on documentation in the [facility] '6 clicks' Inpatient Basic Mobility Short Form.  Research supports that patients with this level of impairment may benefit from HH PT increased assist.  Final disposition will be made by interdisciplinary medical team.    Patient End of Session: In bed, With  staff, Needs met, Call light within reach, RN aware of session/findings, All patient questions and concerns addressed, Hospital anti-slip socks, Ice applied, Family present    CURRENT GOALS  Goals to be met by: 3/20/25  Patient Goal Patient's self-stated goal is: not stated   Goal #1 Patient is able to demonstrate supine - sit EOB @ level: modified independent     Goal #1   Current Status    Goal #2 Patient is able to demonstrate transfers Sit to/from Stand at assistance level: modified independent     Goal #2  Current Status    Goal #3 Patient is able to ambulate 300 feet with assistive device at assistance level: modified independent    Goal #3   Current Status    Goal #4 Patient will negotiate 16 stairs/one curb w/ assistive device and supervision   Goal #4   Current Status    Goal  #5  AROM 0 degrees extension to 95 degrees flexion     Goal #5   Current Status    Goal #6 Patient independently performs home exercise program for ROM/strengthening per the instructions provided in preparation for discharge.   Goal #6  Current Status      Patient Evaluation Complexity Level:  History High - 3 or more personal factors and/or co-morbidities   Examination of body systems Moderate - addressing a total of 3 or more elements   Clinical Presentation  Moderate - Evolving   Clinical Decision Making  Moderate Complexity     Gait Training: 15 minutes  Therapeutic Activity:  16 minutes    Oswaldo Norman, SPT

## 2025-03-06 NOTE — OCCUPATIONAL THERAPY NOTE
OCCUPATIONAL THERAPY EVALUATION - INPATIENT     [facility]  Evaluation Date: 3/6/2025  Type of Evaluation: Initial  Presenting Problem: R TKA    Physician Order: IP Consult to Occupational Therapy  Reason for Therapy: ADL/IADL Dysfunction and Discharge Planning    OCCUPATIONAL THERAPY ASSESSMENT   Patient is a 74 year old male admitted 3/6/2025.  Prior to admission, patient's baseline is independent.  Patient is currently functioning below baseline with ADLs/mobility.  Patient is requiring supervision, contact guard assist, and minimal assist as a result of the following impairments: decreased functional strength, decreased functional reach, pain, and impaired standing balance. Occupational Therapy will continue to follow for duration of hospitalization.    Patient will benefit from continued skilled OT Services for duration of hospitalization, however, given the patient is functioning near baseline level do not anticipate skilled therapy needs at discharge .    PLAN DURING HOSPITALIZATION  OT Device Recommendations: Raised toilet seat  OT Treatment Plan: Balance activities, ADL training, Functional transfer training, Patient/Family education, Equipment eval/education, Patient/Family training, Compensatory technique education     OCCUPATIONAL THERAPY MEDICAL/SOCIAL HISTORY   Problem List   Active Problems:    S/P total knee arthroplasty, right    HOME SITUATION  Type of Home: House  Home Layout: Two level (may be able to make living on first level work)  Lives With: Spouse (able to help when needed)  Toilet and Equipment: Standard height toilet; Other (Comment) (frame over toilet with handles but not elevated)  Shower/Tub and Equipment: Walk-in shower  Drives: Yes  Patient Regularly Uses: Rolling walker; Cane (owns but was not using prior)    Prior Level of [place]: independent    SUBJECTIVE  \"I want to get back to [facility]'\"    OCCUPATIONAL THERAPY EXAMINATION   OBJECTIVE  Precautions:  Bed/chair alarm  Fall Risk: Standard fall risk    WEIGHT BEARING RESTRICTION  R Lower Extremity: Weight Bearing as Tolerated    PAIN ASSESSMENT  Ratin  Location: R knee  Management Techniques: Nurse notified        COGNITION  Overall Cognitive Status:  WFL - within functional limits    RANGE OF MOTION   Upper extremity ROM is within functional limits     STRENGTH ASSESSMENT  Upper extremity strength is within functional limits     ACTIVITIES OF DAILY LIVING ASSESSMENT  AM-PAC ‘6-Clicks’ Inpatient Daily Activity Short Form  How much help from another person does the patient currently need…  -   Putting on and taking off regular lower body clothing?: A Lot  -   Bathing (including washing, rinsing, drying)?: A Little  -   Toileting, which includes using toilet, bedpan or urinal? : A Little  -   Putting on and taking off regular upper body clothing?: None  -   Taking care of personal grooming such as brushing teeth?: A Little  -   Eating meals?: None    AM-PAC Score:  Score: 19  Approx Degree of Impairment: 42.8%  Standardized Score (AM-PAC Scale): 40.22  CMS Modifier (G-Code): CK    FUNCTIONAL TRANSFER ASSESSMENT  Sit to Stand: Edge of Bed  Edge of Bed: Contact Guard Assist  Toilet Transfer: Minimal Assist (instructed to use grab bar)    BED MOBILITY  Sit to Supine (OT): Minimal Assist (assist for RLE)    BALANCE ASSESSMENT  Static Sitting: Independent  Static Standing: Supervision    FUNCTIONAL ADL ASSESSMENT  Eating: Independent  Grooming Standing: Supervision  Toileting Standing: Supervision (standing, attempted urination but unable to urinate-RN aware)  LB dressing: educated on modified techniques and AE available     Skilled Therapy Provided: RN approved session. Handoff from PT. Performed ADLs/functional mobility/transfers as stated above. At the end of session, patient left in bed with needs met, alarm on and RN aware of session.      EDUCATION PROVIDED  Patient Education : Role of Occupational Therapy; Plan  of Care; Discharge Recommendations; DME Recommendations; Functional Transfer Techniques; Weight Bear Status; Other (ADL training)  Patient's Response to Education: Verbalized Understanding; Returned Demonstration  Family/Caregiver Education : -- (same as pt)  Family/Caregiver's Response to Education: Verbalized Understanding; Returned Demonstration    The patient's Approx Degree of Impairment: 42.8% has been calculated based on documentation in the [facility] '6 clicks' Inpatient Daily Activity Short Form.  Research supports that patients with this level of impairment may benefit from HHOT however as pain improves, pt likely will require intermittent assist from spouse.  Final disposition will be made by interdisciplinary medical team.    Patient End of Session: In bed, Call light within reach, Needs met, All patient questions and concerns addressed, RN aware of session/findings, Family present    OT Goals  Patient self-stated goal is: increased independence/less pain     Patient will complete LE dressing with mod I and AE as needed  Comment:     Patient will complete toilet transfer with mod I with raised seat  Comment:     Patient will complete self care task at sink level with mod I   Comment:    Patient will complete bathing with mod I  Comment:         Goals  on: 3/13/2025  Frequency: 1-2 more sessions    Patient Evaluation Complexity Level:   Occupational Profile/Medical History LOW - Brief history including review of medical or therapy records    Specific performance deficits impacting engagement in ADL/IADL LOW  1 - 3 performance deficits    Client Assessment/Performance Deficits LOW - No comorbidities nor modifications of tasks    Clinical Decision Making LOW - Analysis of occupational profile, problem-focused assessments, limited treatment options    Overall Complexity LOW     OT Session Time: 23 minutes  Self-Care Home Management: 13 minutes  10 min barbara Alicea OTR/L

## 2025-03-06 NOTE — ANESTHESIA PROCEDURE NOTES
Spinal Block    Date/Time: 3/6/2025 7:15 AM    Performed by: Alva Sheriff CRNA  Authorized by: Ganga Rocha MD      General Information and Staff    Start Time:  3/6/2025 7:15 AM  End Time:  3/6/2025 7:23 AM  CRNA:  Alva Sheriff CRNA  Performed by:  CRNA  Patient Location:  OR  Preanesthetic Checklist: IV checked, risks and benefits discussed, monitors and equipment checked, pre-op evaluation, timeout performed, anesthesia consent and sterile technique used      Procedure Details    Patient Position:  Sitting  Prep: ChloraPrep    Monitoring:  Continuous pulse ox  Approach:  Midline  Location:  L3-4    Needle    Needle Type:  Pencil-tip  Needle Gauge:  24 G  Needle Length:  4 in    Assessment    Sensory Level:  T10  Events: clear CSF, CSF aspirated and well tolerated      Additional Comments

## 2025-03-06 NOTE — OPERATIVE REPORT
PATIENT'S NAME: Luis Antonio Pavon    ATTENDING PHYSICIAN: Angel Smith MD   OPERATING PHYSICIAN: Angel Smith MD   PATIENT ACCOUNT#: [id number]   MEDICAL RECORD #: [id number]   YOB: 1950          ADMISSION DATE: 3/6/2025           OPERATION DATE: 3/6/2025       OPERATIVE REPORT        PREOPERATIVE DIAGNOSIS: Right knee osteoarthritis.  POSTOPERATIVE DIAGNOSIS:  Right knee osteoarthritis.  PROCEDURE:  Robotic assisted right total knee arthroplasty.     COMPLICATIONS:  None.     ANESTHESIA:  Spinal plus adductor block.     SPECIMENS REMOVED:  Bony components sent to Pathology.     IMPLANTS USED:  Emelia Persona cementless femoral component size 11 right narrow CR; cementless tibial component, size F; cementless patella, size  38; articular insert, size 11.       TOURNIQUET TIME:  approx 43 minutes.     BLOOD LOSS:  Approximately 100 mL.     ASSISTANTS:  Jennifer Rodriguez PA-C, and HERBIE Chowdhury.       INDICATIONS:  The patient is a pleasant 74-year-old man who has failed nonoperative treatment of right knee osteoarthritis.  We discussed risks and benefits of operative intervention going forward with the surgery to include infection, stiffness, neurovascular injury, anesthetic risk, continued pain, possible need for further surgery, DVT, PE. He understood these risks and desired to proceed.     OPERATIVE TECHNIQUE:  After adequate induction of spinal anesthesia and adductor block, right lower extremity was prepped and draped in the usual sterile fashion after application of well-padded tourniquet to the right upper thigh.  Prior to the case, the leg was exsanguinated, tourniquet taken up to 250 mmHg.  At this point, a standard midline incision was made, followed by a medial parapatellar arthrotomy.  Patella was everted and knee was flexed.  Fat pad was removed.  ACL was removed.  At this time, tracking pins were placed.  The robotic arm was then used to placed distal femoral pins, drill  4-in-1 cutting holes, and place proximal tibial pins.  Distal cut was made.  A 4-in-1 cutting block was placed.  Anterior, posterior, and chamfer cuts were then made.  PCL retractor was used to gain access to the proximal tibia, and menisci were then removed at this time. Proximal tibia cut was made.  Flexion-extension gaps were found to be stable at approximately 11 mm.  Excellent bone quality was noted, as such decision was made to proceed with press fit components.  At this time, a tibial tray was placed in appropriate rotation and pinned.  Femoral component was placed and pinned.  Trial polyethylene was placed.  The patella was rongeured free of osteophytes, reamed down to appropriate size.  A 38 mm patellar button was then medialized once peg holes were drilled.  Knee was taken through range of motion and the patella was found to track well.  At this time, the femur was prepared with a drill.  Tibia was prepared with a drill and punch.  All trials were removed.  Bony surfaces were irrigated. Cementless components were then placed, followed by a trial insert.  After assessing stability, the trial polyethylene was removed.  The final polyethylene was placed after appropriate irrigation.  Knee was irrigated thoroughly one additional time.  The arthrotomy was closed with #1 Ethibond, subcutaneous tissue with 2-0 Vicryl, skin with staples.  It should be noted that prior to closure, pain cocktail was used for local anesthetic.  Sterile dressings were applied.  The patient tolerated the procedure well.  He was taken to PACU in stable condition.  Please note that prior to the case a time-out was completed, correct site was identified, and preoperative antibiotics and TXA were given.  Please also note that assistants were required for the case to help with different sawing and drilling techniques.      Angel Smith MD

## 2025-03-06 NOTE — ANESTHESIA POSTPROCEDURE EVALUATION
Patient: Luis Antonio Pavon    Procedure Summary       Date: 03/06/25 Room / Location: [facility] MAIN OR  / [facility] MAIN OR    Anesthesia Start: 0714 Anesthesia Stop: 0853    Procedure: Right total knee arthroplasty (Right: Abdomen) Diagnosis: (Primary osteoarthritis of right knee)    Surgeons: Angel Smith MD Anesthesiologist: Ganga Rocha MD    Anesthesia Type: spinal, regional ASA Status: 2            Anesthesia Type: spinal, regional    Vitals Value Taken Time   /62 03/06/25 0853   Temp 97.4 °F (36.3 °C) 03/06/25 0853   Pulse 82 03/06/25 0854   Resp 15 03/06/25 0854   SpO2 94 % 03/06/25 0854   Vitals shown include unfiled device data.    [facility] AN Post Evaluation:   Patient Evaluated in PACU  Patient Participation: complete - patient participated  Level of Consciousness: awake and alert  Pain Score: 0  Pain Management: adequate  Airway Patency:patent  Yes    Nausea/Vomiting: none  Cardiovascular Status: acceptable, blood pressure returned to baseline and hemodynamically stable  Respiratory Status: acceptable  Postoperative Hydration acceptable  Comments: Able to wiggle toemark Sheriff CRNA  3/6/2025 8:54 AM

## 2025-03-07 ENCOUNTER — APPOINTMENT (OUTPATIENT)
Dept: CT IMAGING | Facility: HOSPITAL | Age: 75
End: 2025-03-07
Attending: HOSPITALIST
Payer: MEDICARE

## 2025-03-07 ENCOUNTER — APPOINTMENT (OUTPATIENT)
Dept: CV DIAGNOSTICS | Facility: HOSPITAL | Age: 75
End: 2025-03-07
Attending: HOSPITALIST
Payer: MEDICARE

## 2025-03-07 PROBLEM — Z01.818 PREOP TESTING: Status: ACTIVE | Noted: 2025-03-07

## 2025-03-07 LAB
ANION GAP SERPL CALC-SCNC: 10 MMOL/L (ref 0–18)
APTT PPP: 26.3 SECONDS (ref 23–36)
ATRIAL RATE: 74 BPM
BASOPHILS # BLD AUTO: 0.03 X10(3) UL (ref 0–0.2)
BASOPHILS NFR BLD AUTO: 0.5 %
BUN BLD-MCNC: 21 MG/DL (ref 9–23)
BUN/CREAT SERPL: 23.1 (ref 10–20)
CALCIUM BLD-MCNC: 8.5 MG/DL (ref 8.7–10.4)
CHLORIDE SERPL-SCNC: 105 MMOL/L (ref 98–112)
CO2 SERPL-SCNC: 23 MMOL/L (ref 21–32)
CREAT BLD-MCNC: 0.91 MG/DL
DEPRECATED RDW RBC AUTO: 43.1 FL (ref 35.1–46.3)
EGFRCR SERPLBLD CKD-EPI 2021: 88 ML/MIN/1.73M2 (ref 60–?)
EOSINOPHIL # BLD AUTO: 0.16 X10(3) UL (ref 0–0.7)
EOSINOPHIL NFR BLD AUTO: 2.5 %
ERYTHROCYTE [DISTWIDTH] IN BLOOD BY AUTOMATED COUNT: 12.7 % (ref 11–15)
GLUCOSE BLD-MCNC: 126 MG/DL (ref 70–99)
GLUCOSE BLD-MCNC: 134 MG/DL (ref 70–99)
GLUCOSE BLDC GLUCOMTR-MCNC: 118 MG/DL (ref 70–99)
HCT VFR BLD AUTO: 34.9 %
HGB BLD-MCNC: 12.7 G/DL
HGB BLD-MCNC: 13.5 G/DL
IMM GRANULOCYTES # BLD AUTO: 0.01 X10(3) UL (ref 0–1)
IMM GRANULOCYTES NFR BLD: 0.2 %
INR BLD: 1.27 (ref 0.8–1.2)
LYMPHOCYTES # BLD AUTO: 1.38 X10(3) UL (ref 1–4)
LYMPHOCYTES NFR BLD AUTO: 21.7 %
MCH RBC QN AUTO: 33.7 PG (ref 26–34)
MCHC RBC AUTO-ENTMCNC: 36.4 G/DL (ref 31–37)
MCV RBC AUTO: 92.6 FL
MONOCYTES # BLD AUTO: 0.75 X10(3) UL (ref 0.1–1)
MONOCYTES NFR BLD AUTO: 11.8 %
NEUTROPHILS # BLD AUTO: 4.03 X10 (3) UL (ref 1.5–7.7)
NEUTROPHILS # BLD AUTO: 4.03 X10(3) UL (ref 1.5–7.7)
NEUTROPHILS NFR BLD AUTO: 63.3 %
OSMOLALITY SERPL CALC.SUM OF ELEC: 291 MOSM/KG (ref 275–295)
P AXIS: 50 DEGREES
P-R INTERVAL: 142 MS
PLATELET # BLD AUTO: 120 10(3)UL (ref 150–450)
PLATELETS.RETICULATED NFR BLD AUTO: 17.6 % (ref 0–7)
POTASSIUM SERPL-SCNC: 3.9 MMOL/L (ref 3.5–5.1)
PROTHROMBIN TIME: 16.7 SECONDS (ref 11.6–14.8)
Q-T INTERVAL: 434 MS
QRS DURATION: 144 MS
QTC CALCULATION (BEZET): 481 MS
R AXIS: 3 DEGREES
RBC # BLD AUTO: 3.77 X10(6)UL
SODIUM SERPL-SCNC: 138 MMOL/L (ref 136–145)
T AXIS: 18 DEGREES
TROPONIN I SERPL HS-MCNC: 8 NG/L
VENTRICULAR RATE: 74 BPM
WBC # BLD AUTO: 6.4 X10(3) UL (ref 4–11)

## 2025-03-07 PROCEDURE — 84484 ASSAY OF TROPONIN QUANT: CPT | Performed by: HOSPITALIST

## 2025-03-07 PROCEDURE — 85018 HEMOGLOBIN: CPT | Performed by: HOSPITALIST

## 2025-03-07 PROCEDURE — 85025 COMPLETE CBC W/AUTO DIFF WBC: CPT | Performed by: HOSPITALIST

## 2025-03-07 PROCEDURE — 70450 CT HEAD/BRAIN W/O DYE: CPT | Performed by: HOSPITALIST

## 2025-03-07 PROCEDURE — 93306 TTE W/DOPPLER COMPLETE: CPT | Performed by: HOSPITALIST

## 2025-03-07 PROCEDURE — 82947 ASSAY GLUCOSE BLOOD QUANT: CPT | Performed by: HOSPITALIST

## 2025-03-07 PROCEDURE — 70498 CT ANGIOGRAPHY NECK: CPT | Performed by: HOSPITALIST

## 2025-03-07 PROCEDURE — 85730 THROMBOPLASTIN TIME PARTIAL: CPT | Performed by: HOSPITALIST

## 2025-03-07 PROCEDURE — 97116 GAIT TRAINING THERAPY: CPT

## 2025-03-07 PROCEDURE — 80048 BASIC METABOLIC PNL TOTAL CA: CPT | Performed by: HOSPITALIST

## 2025-03-07 PROCEDURE — 97530 THERAPEUTIC ACTIVITIES: CPT

## 2025-03-07 PROCEDURE — 82962 GLUCOSE BLOOD TEST: CPT

## 2025-03-07 PROCEDURE — 93005 ELECTROCARDIOGRAM TRACING: CPT

## 2025-03-07 PROCEDURE — 93010 ELECTROCARDIOGRAM REPORT: CPT | Performed by: STUDENT IN AN ORGANIZED HEALTH CARE EDUCATION/TRAINING PROGRAM

## 2025-03-07 PROCEDURE — 97110 THERAPEUTIC EXERCISES: CPT

## 2025-03-07 PROCEDURE — 70496 CT ANGIOGRAPHY HEAD: CPT | Performed by: HOSPITALIST

## 2025-03-07 PROCEDURE — 94760 N-INVAS EAR/PLS OXIMETRY 1: CPT

## 2025-03-07 PROCEDURE — 85610 PROTHROMBIN TIME: CPT | Performed by: HOSPITALIST

## 2025-03-07 RX ORDER — SODIUM CHLORIDE 9 MG/ML
INJECTION, SOLUTION INTRAVENOUS CONTINUOUS
Status: DISCONTINUED | OUTPATIENT
Start: 2025-03-07 | End: 2025-03-08

## 2025-03-07 RX ORDER — LABETALOL HYDROCHLORIDE 5 MG/ML
10 INJECTION, SOLUTION INTRAVENOUS ONCE
Status: DISCONTINUED | OUTPATIENT
Start: 2025-03-07 | End: 2025-03-07

## 2025-03-07 NOTE — PROGRESS NOTES
[facility]  part of [facility]    Progress Note    Luis Antonio Pavon Patient Status:  Outpatient in a Bed    3/10/1950 MRN [id number]   Location [facility] 4W/SW/SE Attending Angel Smith MD   Hosp Day # 0 PCP Jonathon Palacio MD     SUBJECTIVE:  Interval History: Patient has no current complaints.  Pain tolerable.  Patient denies chest pain, shortness of breath and calf pain.    RR called last night, LOC on toilet. AMS when in PT this AM. Hospitalist on site, labs/EKG/CT head performed.    Post-Op Day: 1    OBJECTIVE:  Blood pressure 128/75, pulse 68, temperature 96.7 °F (35.9 °C), temperature source Temporal, resp. rate 18, height 6' 2\" (1.88 m), weight 231 lb (104.8 kg), SpO2 98%.     A&O x 3, NAD, No SOB  Lying in bed, wife at bedside    Recent Results (from the past 24 hours)   Hemoglobin    Collection Time: 25  4:57 AM   Result Value Ref Range    HGB 13.5 13.0 - 17.5 g/dL   Basic Metabolic Panel (8)    Collection Time: 25  4:57 AM   Result Value Ref Range    Glucose 134 (H) 70 - 99 mg/dL    Sodium 138 136 - 145 mmol/L    Potassium 3.9 3.5 - 5.1 mmol/L    Chloride 105 98 - 112 mmol/L    CO2 23.0 21.0 - 32.0 mmol/L    Anion Gap 10 0 - 18 mmol/L    BUN 21 9 - 23 mg/dL    Creatinine 0.91 0.70 - 1.30 mg/dL    BUN/CREA Ratio 23.1 (H) 10.0 - 20.0    Calcium, Total 8.5 (L) 8.7 - 10.4 mg/dL    Calculated Osmolality 291 275 - 295 mOsm/kg    eGFR-Cr 88 >=60 mL/min/1.73m2   Troponin I (High Sensitivity)    Collection Time: 25  8:42 AM   Result Value Ref Range    Troponin I (High Sensitivity) 8 <=53 ng/L   RAINBOW DRAW LAVENDER    Collection Time: 25  8:42 AM   Result Value Ref Range    Hold Lavender Auto Resulted    EKG 12 Lead    Collection Time: 25  9:21 AM   Result Value Ref Range    Ventricular rate 74 BPM    Atrial rate 74 BPM    P-R Interval 142 ms    QRS Duration 144 ms    Q-T Interval 434 ms    QTC Calculation (Bezet) 481 ms    P Axis 50 degrees    R Axis  3 degrees    T Axis 18 degrees   POCT Glucose    Collection Time: 03/07/25 10:50 AM   Result Value Ref Range    POC Glucose  118 (H) 70 - 99 mg/dL   Glucose, Serum    Collection Time: 03/07/25 11:35 AM   Result Value Ref Range    Glucose 126 (H) 70 - 99 mg/dL   CBC With Differential With Platelet    Collection Time: 03/07/25 11:35 AM   Result Value Ref Range    WBC 6.4 4.0 - 11.0 x10(3) uL    RBC 3.77 (L) 3.80 - 5.80 x10(6)uL    HGB 12.7 (L) 13.0 - 17.5 g/dL    HCT 34.9 (L) 39.0 - 53.0 %    MCV 92.6 80.0 - 100.0 fL    MCH 33.7 26.0 - 34.0 pg    MCHC 36.4 31.0 - 37.0 g/dL    RDW-SD 43.1 35.1 - 46.3 fL    RDW 12.7 11.0 - 15.0 %    .0 (L) 150.0 - 450.0 10(3)uL    Immature Platelet Fraction 17.6 (H) 0.0 - 7.0 %    Neutrophil Absolute Prelim 4.03 1.50 - 7.70 x10 (3) uL    Neutrophil Absolute 4.03 1.50 - 7.70 x10(3) uL    Lymphocyte Absolute 1.38 1.00 - 4.00 x10(3) uL    Monocyte Absolute 0.75 0.10 - 1.00 x10(3) uL    Eosinophil Absolute 0.16 0.00 - 0.70 x10(3) uL    Basophil Absolute 0.03 0.00 - 0.20 x10(3) uL    Immature Granulocyte Absolute 0.01 0.00 - 1.00 x10(3) uL    Neutrophil % 63.3 %    Lymphocyte % 21.7 %    Monocyte % 11.8 %    Eosinophil % 2.5 %    Basophil % 0.5 %    Immature Granulocyte % 0.2 %   Prothrombin Time (PT)    Collection Time: 03/07/25 11:35 AM   Result Value Ref Range    PT 16.7 (H) 11.6 - 14.8 seconds    INR 1.27 (H) 0.80 - 1.20   PTT, Activated    Collection Time: 03/07/25 11:35 AM   Result Value Ref Range    PTT 26.3 23.0 - 36.0 seconds        CT STROKE CTA BRAIN/CTA NECK (W IV)(CPT=70496/17035)    Result Date: 3/7/2025  CONCLUSION:   No evidence of intracranial large vessel arterial occlusion or proximal flow limiting stenosis.  No evidence of occlusion, hemodynamically significant stenosis or evidence to suggest dissection of the bilateral cervical carotid and vertebral arteries.  Findings were discussed with the patient's RN Alvarez at 11:27 a.m. 03/07/2025.    Dictated by (CST):  Johnny Martinez MD on 3/07/2025 at 11:21 AM     Finalized by (CST): Johnny Martinez MD on 3/07/2025 at 11:27 AM          CT STROKE BRAIN (NO IV)(CPT=70450)    Result Date: 3/7/2025  CONCLUSION:   No evidence of acute intracranial abnormality.  Moderate chronic microvascular white matter ischemia.  Slight thickening with aerated secretions ethmoid and sphenoid sinuses which can be seen with sinusitis.  Findings were discussed with patient's RN Alvarez at 11:18 a.m. 03/07/2025.    Dictated by (CST): Johnny Martinez MD on 3/07/2025 at 11:15 AM     Finalized by (CST): Johnny Martinez MD on 3/07/2025 at 11:19 AM          XR KNEE (1 OR 2 VIEWS), RIGHT (CPT=73560)    Result Date: 3/6/2025  CONCLUSION: Expected postsurgical changes status post recent right knee arthroplasty.    Dictated by (CST): Javi Renteria MD on 3/06/2025 at 10:27 AM     Finalized by (CST): Javi Renteria MD on 3/06/2025 at 10:27 AM              Ortho Exam:  Dressing clean and dry.  No erythema, non-acute trace drainage on dressing.  Pulses 2+.   Sensation is intact.  + DF/PF/EHL.  Calf is soft and nontender. Negative Marifer's test.  Compartments soft. Ecchymosis thigh due to tourniquet.        ASSESSMENT/PLAN:    S/P Left TKA  2 episodes ams-workup ongoing, appreciate medicine attention    1) Continue pain control-hydrocodone  2) Continue DVT prophylaxis with 81mg asa bid  3) Continue PT/OT-bedside until cleared by medicine  4)Continue per medicine-heart monitor and ECHO to follow  5)d/c planning to home when cleared by other services    Jennifer Rodriguez PA-C  3/7/2025  12:35 PM

## 2025-03-07 NOTE — SPIRITUAL CARE NOTE
Spiritual Care Visit Note    Patient Name: Luis Antonio Pavon Date of Spiritual Care Visit: 25   : 3/10/1950 Primary Dx: <principal problem not specified>       Referred By: Referral From: Other (Comment) (IDT)    Spiritual Care Taxonomy:    Intended Effects: Lessen anxiety    Methods: Offer emotional support, Offer spiritual/Congregation support    Interventions: Acknowledge current situation, Acknowledge response to difficult experience, Active listening, Ask guided questions, Ask guided questions about abigail, Explain  role, Identify supportive relationship(s), Prayer for healing, Provide hospitality, Share words of hope and inspiration, Discuss plan of care    Visit Type/Summary:     - Spiritual Care: Responded to a request via the on call phone Offered empathic listening and emotional support. Patient and family expressed appreciation for  visit. Provided information regarding how to contact Spiritual Care and left a Spiritual Care information card. Prayed with wife after pt left for CT scan. Daughter is on her way. Patient has five grandchildren.  remains available as needed for follow up.    Spiritual Care support can be requested via an Epic consult. For urgent/immediate needs, please contact the On Call  at: [place]: ext [phone]    Chaplain Resident, Laurie Ford PhD

## 2025-03-07 NOTE — H&P
[facility]  part of [facility] Hospitalist H&P     CC: Postop management    PCP: Jonathon Palacio MD      Assessment and Plan     Luis Antonio Pavon is a 74 year old male with PMH sig for GERD, known right bundle branch block, prior skin cancer with yearly skin checks who presented for right total knee arthroplasty.    Right total knee arthroplasty  - As needed IV p.o. pain meds for postop pain control  - Monitor acute blood loss anemia as expected  - PT OT, Ortho    History of right bundle branch block  - No chest pain or shortness of breath, tolerated surgery well    FEN: IV fluids protocol, lites in a.m., diet as tolerated    PPx: Aspirin    Dispo: Full code Co. Course    Outpatient records reviewed confirming patient's medical history and medications.     Further recommendations pending patient's clinical course.  [facility] [place] to continue to follow patient while in house    Patient and/or patient's family given opportunity to ask questions and note understanding and agreeing with therapeutic plan as outlined    Note: This chart was prepared using voice recognition software and may contain unintended word substitution errors.     Discussed with wife at bedside    Thank You,  Kathy Gonzalez MD  [facility] [place]      Answering Service number: [phone]    HPI     Luis Antonio Pavon is a 74 year old male with PMH sig for GERD, known right bundle branch block, prior skin cancer with yearly skin checks who presented for right total knee arthroplasty.    Postoperatively he is doing fairly well.  He was able to ambulate with PT and OT but did not feel ready for home.  No chest pain or shortness of breath.  No nausea vomiting.  Pain so far controlled.  Able to void although it did initially take him a few hours has voided since again.  Without issue    Review of Systems  12 point systems reviewed, please see HPI for pertinent positives, otherwise negative    History     PMH  Past Medical  History:    Cancer (HCC)    skin cancer    Chondromalacia of patella    Log Date: 02/18/2013     Colon polyp    Dislocation of shoulder    bilat mult times, sees ortho prn    ED (erectile dysfunction)    GERD (gastroesophageal reflux disease)    uses tums 2-3 times weekly, fermin after acidic foods. no trouble swallowing    Hematuria    work up by Dr. Caraballo negative    Hemorrhoids    better with surgery    History of bilateral inguinal hernia repair    saw Sapna, mark fall 2016    Living will in place    Low HDL (under 40)    Nasal congestion    saw ENT    Need for hepatitis C screening test    neg    Normal eye exam    at \[facility]", will go 2021    Osteoarthritis    RBBB    new from 2010, asyptomatic, no further w/u indicated,  discussed with pt ( was nl 2/22/10)    Screening for cardiovascular condition    normal CGXT with EF of 52%    Screening for skin condition    Dr. Sena, sees yearly        PSH  Past Surgical History:   Procedure Laterality Date    Cataract      Colonoscopy  05/05/2014    Procedure: COLONOSCOPY, POSSIBLE BIOPSY, POSSIBLE POLYPECTOMY 61552;  Surgeon: Nathan Gibbs MD;  Location: [facility]    Colonoscopy N/A 05/06/2019    Procedure: COLONOSCOPY, POSSIBLE BIOPSY, POSSIBLE POLYPECTOMY 90648;  Surgeon: Nathan Gibbs MD;  Location: [facility]    Colonoscopy,diagnostic  1999, 2004, 2009, 5/5/14    neg Dr. Gibbs, juana 2019 due to hx    Hemorrhoidectomy,int/ext,complx  04/09/2009    Performed by CHUCK ALEJO at [facility]    Hernia surgery Bilateral 11/08/2016    Dr. Alejo    Other surgical history Left 1971    shoulder surg    Repair ing hernia,4+y/o,reducibl Left 1965    L inguinal    Repair of nasal septum  1991, 2001    Tonsillectomy      Umbilical hernia repair  03/07/2019    Dr. Alejo        ALL:  Allergies[1]     Home Medications:  Medications Taking[2]    Soc Hx  Social History     Tobacco Use    Smoking status: Former     Current  packs/day: 0.00     Average packs/day: 0.8 packs/day for 10.0 years (7.5 ttl pk-yrs)     Types: Cigarettes     Start date: 1975     Quit date: 1985     Years since quittin.2    Smokeless tobacco: Never   Substance Use Topics    Alcohol use: Yes     Alcohol/week: 2.5 standard drinks of alcohol     Types: 3 Standard drinks or equivalent per week     Comment: 3 x weekly        Fam Hx  Family History   Problem Relation Age of Onset    Heart Disorder Father         cardiomyopathy? ICD    Heart Disorder Brother         murmur    Psychiatric Mother         memory issues    No Known Problems Daughter     No Known Problems Daughter     Cancer Daughter 30        melanoma    No Known Problems Brother            Objective     Temp: 97.5 °F (36.4 °C)  Pulse: 70  Resp: 12  BP: 118/70    Exam  Gen: No acute distress, alert and oriented x3  Neck Supple, no JVD  Pulm: Lungs clear, normal respiratory effort, No wheezing or crackles  CV: Heart with regular rate and rhythm, No murmurs, rubs, gallops  Abd: Abdomen soft, nontender, nondistended, no organomegaly, bowel sounds present  MSK: Postop right knee  Skin: no rashes or lesions, well perfused  Psych: mood stable, cooperative  Neuro: No focal deficits    Diagnostic Data:    CBC/Chem  No results for input(s): \"WBC\", \"HGB\", \"MCV\", \"PLT\", \"BAND\", \"INR\" in the last 168 hours.    Invalid input(s): \"LYM#\", \"MONO#\", \"BASOS#\", \"EOSIN#\"    No results for input(s): \"NA\", \"K\", \"CL\", \"CO2\", \"BUN\", \"CREATSERUM\", \"GLU\", \"CA\", \"CAION\", \"MG\", \"PHOS\" in the last 168 hours.    No results for input(s): \"ALT\", \"AST\", \"ALB\", \"AMYLASE\", \"LIPASE\", \"LDH\" in the last 168 hours.    Invalid input(s): \"ALPHOS\", \"TBIL\", \"DBIL\", \"TPROT\"    No results for input(s): \"TROP\" in the last 168 hours.      Radiology: XR KNEE (1 OR 2 VIEWS), RIGHT (CPT=73560)    Result Date: 3/6/2025  PROCEDURE: XR KNEE (1 OR 2 VIEWS), RIGHT (CPT=73560)  COMPARISON: None.  INDICATIONS: s/p right tka  TECHNIQUE: 2 views were  obtained.   FINDINGS:   The patient is status post recent right knee arthroplasty with femoral, tibial, and patellar components in expected configuration.  There is edema and subcutaneous emphysema in the surrounding soft tissues compatible with recent postsurgical state.         CONCLUSION: Expected postsurgical changes status post recent right knee arthroplasty.    Dictated by (CST): Javi Renteria MD on 3/06/2025 at 10:27 AM     Finalized by (CST): Javi Renteria MD on 3/06/2025 at 10:27 AM                      [1] No Known Allergies  [2]   Outpatient Medications Marked as Taking for the 3/6/25 encounter (Hospital Encounter)   Medication Sig Dispense Refill    Cholecalciferol (VITAMIN D) 50 MCG (2000 UT) Oral Cap       Saccharomyces boulardii (PROBIOTIC) 250 MG Oral Cap       Polypodium Leucotomos (HELIOCARE OR) Take 1 tablet by mouth daily.      Fluticasone Propionate (FLONASE) 50 MCG/ACT Nasal Suspension SPRAY 2 SPRAYS IN EACH NOSTRIL EVERY DAY 48 g 3    Multiple Vitamins-Minerals (OCUVITE ADULT 50+ OR) Take  by mouth daily.      Cetirizine HCl (ZYRTEC) 10 MG Oral Tab 1 TABLET DAILY      COSAMIN -400 MG OR CAPS 2 capsules daily

## 2025-03-07 NOTE — SIGNIFICANT EVENT
RRT    *See RRT Documentation Record*    Reason the RRT was called: Patient was with PT gym, blood pressure was low complaints of dizziness and pt was diaphoretic   Assessment of patient leading up to RRT: Brought to room patient still dizzy and not able to speak. Had to place back in bed  Interventions/Testing: Echo, CTA/CT brain and neck, PTT, PTCBC, placed on tele   Patient Outcome/Disposition: Back in room/ remained on unit  Family Notified: Wife at bedside  Name of family notified: Spouse Katie at bedside.

## 2025-03-07 NOTE — CONSULTS
[facility]  [address] [place] [address], [address]  [facility] [address]  [phone]          STROKE  CONSULTATION NOTE  [facility]  part of [facility]    Report of Consultation    Luis Antonio Pavon Patient Status:  Outpatient in a Bed     3/10/1950 MRN [id number]    Location [facility] 4W/SW/SE Attending Angel Smith MD    Hosp Day # 0 PCP Jonathon Palacio MD      Date of Admission:  3/6/2025  Date of Consult:  3/7/2025  Reason for Admission/Consultation: ***    Requested by: Angel Smith MD  Name of Outside Hospital if Transferred: N/A  Time of patient arrival:    on    Time of initial page: *** on 25         Time of encounter:*** on 25  Time when imaging was reviewed by radiology: *** on 3/7/2025  Time when tPA preparation was recommended to the primary team: {n/a:[id number]::\"***\"}on 3/7/2025  Time when final decision to offer tPA was made: {n/a:134173196::\"***\"}on 3/7/2025  Time when the endovascular team was notified of the case: {n/a:860674250::\"***\"} 25  Time when decision to transfer the patient was made: {n/a:882674075::\"***\"} 25       _________________________________________________________________________________________    Chief Complaint: No chief complaint on file.      HPI:  Luis Antonio Pavon is a 74 year old {Righthanded/lefthanded:68321::\"***\"} male w/ a pmhx sig. for  {Relevant PMHx cognitive:9708},***,and***,  who ***        Prior stroke/TIAs (date, description):       Date  Topography/symptoms  Etiology      Vascular Risk Factors:   {Vascular risk factors:9754}    Prior to admission, taking antithrombotic: {YES/NO:5531}   Agent: ***  Prior to admission, taking statin: {YES/NO:5531}   Agent: Cholesterol Meds:       Prior to admission, taking antihypertensive: {YES/NO:5531}   Agent (s):      Review and summation of prior records        ROS:  Pertinent positive and negatives per HPI.  All others were reviewed and  negative.    Past Medical History:    Cancer (HCC)    skin cancer    Chondromalacia of patella    Log Date: 02/18/2013     Colon polyp    Dislocation of shoulder    bilat mult times, sees ortho prn    ED (erectile dysfunction)    GERD (gastroesophageal reflux disease)    uses tums 2-3 times weekly, fermin after acidic foods. no trouble swallowing    Hematuria    work up by Dr. Caraballo negative    Hemorrhoids    better with surgery    History of bilateral inguinal hernia repair    saw Sapna, mark fall 2016    Living will in place    Low HDL (under 40)    Nasal congestion    saw ENT    Need for hepatitis C screening test    neg    Normal eye exam    at [facility], will go 2021    Osteoarthritis    RBBB    new from 2010, asyptomatic, no further w/u indicated,  discussed with pt ( was nl 2/22/10)    Screening for cardiovascular condition    normal CGXT with EF of 52%    Screening for skin condition    Dr. Sena, sees yearly       Past Surgical History:   Procedure Laterality Date    Cataract      Colonoscopy  05/05/2014    Procedure: COLONOSCOPY, POSSIBLE BIOPSY, POSSIBLE POLYPECTOMY 74301;  Surgeon: Nathan Gibbs MD;  Location: [facility]    Colonoscopy N/A 05/06/2019    Procedure: COLONOSCOPY, POSSIBLE BIOPSY, POSSIBLE POLYPECTOMY 06291;  Surgeon: Nathan Gibbs MD;  Location: [facility]    Colonoscopy,diagnostic  1999, 2004, 2009, 5/5/14    neg Dr. Gibbs, recheck 2019 due to hx    Hemorrhoidectomy,int/ext,complx  04/09/2009    Performed by CHUCK ALEJO at [facility]    Hernia surgery Bilateral 11/08/2016    Dr. Alejo    Other surgical history Left 1971    shoulder surg    Repair ing hernia,4+y/o,reducibl Left 1965    L inguinal    Repair of nasal septum  1991, 2001    Tonsillectomy      Umbilical hernia repair  03/07/2019    Dr. Alejo       Family History   Problem Relation Age of Onset    Heart Disorder Father         cardiomyopathy? ICD    Heart Disorder Brother          murmur    Psychiatric Mother         memory issues    No Known Problems Daughter     No Known Problems Daughter     Cancer Daughter 30        melanoma    No Known Problems Brother        Social History     Socioeconomic History    Marital status:      Spouse name: Not on file    Number of children: 3    Years of education: Not on file    Highest education level: Not on file   Occupational History    Occupation: health care consultant,retired   Tobacco Use    Smoking status: Former     Current packs/day: 0.00     Average packs/day: 0.8 packs/day for 10.0 years (7.5 ttl pk-yrs)     Types: Cigarettes     Start date: 1975     Quit date: 1985     Years since quittin.2    Smokeless tobacco: Never   Vaping Use    Vaping status: Never Used   Substance and Sexual Activity    Alcohol use: Yes     Alcohol/week: 2.5 standard drinks of alcohol     Types: 3 Standard drinks or equivalent per week     Comment: 3 x weekly    Drug use: No    Sexual activity: Not Currently     Partners: Female   Other Topics Concern     Service No    Blood Transfusions No    Caffeine Concern Not Asked    Occupational Exposure Not Asked    Hobby Hazards Not Asked    Sleep Concern Not Asked    Stress Concern Not Asked    Weight Concern Not Asked    Special Diet Not Asked    Back Care Not Asked    Exercise Yes     Comment: walking due to covid 21    Bike Helmet Not Asked    Seat Belt Yes    Self-Exams Yes   Social History Narrative    Not on file     Social Drivers of Health     Food Insecurity: No Food Insecurity (3/6/2025)    NCSS - Food Insecurity     Worried About Running Out of Food in the Last Year: No     Ran Out of Food in the Last Year: No   Transportation Needs: No Transportation Needs (3/6/2025)    NCSS - Transportation     Lack of Transportation: No   Stress: Not on file   Housing Stability: Not At Risk (3/6/2025)    NCSS - Housing/Utilities     Has Housing: Yes     Worried About Losing Housing: No     Unable  to Get Utilities: No       Outpatient Medications  Current Outpatient Medications   Medication Instructions    Cetirizine HCl (ZYRTEC) 10 MG Oral Tab 1 TABLET DAILY    Cholecalciferol (VITAMIN D) 50 MCG (2000 UT) Oral Cap No dose, route, or frequency recorded.    COSAMIN -400 MG OR CAPS 2 capsules daily    Fluticasone Propionate (FLONASE) 50 MCG/ACT Nasal Suspension SPRAY 2 SPRAYS IN EACH NOSTRIL EVERY DAY    ketoconazole 2 % External Cream Apply to rash between toes BID    Multiple Vitamins-Minerals (OCUVITE ADULT 50+ OR) Daily    Polypodium Leucotomos (HELIOCARE OR) 1 tablet, Daily    Saccharomyces boulardii (PROBIOTIC) 250 MG Oral Cap No dose, route, or frequency recorded.        Inpatient Medications  No current outpatient medications on file.        labetalol  10 mg Intravenous Once    aspirin  81 mg Oral BID    sennosides  17.2 mg Oral Nightly    docusate sodium  100 mg Oral BID    famotidine  20 mg Oral BID    Or    famotidine  20 mg Intravenous BID    acetaminophen  1,000 mg Oral Q8H    fluticasone propionate  2 spray Nasal Daily    cetirizine  10 mg Oral Daily         sodium chloride    polyethylene glycol (PEG 3350)    magnesium hydroxide    bisacodyl    fleet enema    ondansetron    metoclopramide    diphenhydrAMINE **OR** diphenhydrAMINE    [Transfer Hold] HYDROcodone-acetaminophen    morphINE **OR** morphINE **OR** morphINE    oxyCODONE **OR** oxyCODONE **OR** morphINE    Objective:  Last vitals and weight :  Vitals:    03/07/25 1045   BP: 128/75   Pulse: 68   Resp:    Temp:        Exam:  - General: {Exam; general:28210::\"appears stated age\",\"no distress\"}  - CV: symmetric pulses   Carotids:   - Pulmonary: no signs of respiratory distress. Normal excursion of the chest.   Neurologic Exam  - Mental Status: Alert and attentive. {orientation:33186}.  Speech is spontaneous, fluent, and prosodic. Comprehension and repetition intact. Phrase length and rate are normal. No paraphasic errors, neologisms,  anomia, acalculia, apraxia, anosognosia, or R/L confusion. No neglect.   - Cranial Nerves: No gaze preference. Visual fields:{FINDINGS; EYE EXAM VISUAL FIELD DEFECTS:99897::\"normal\"}  Pupils are ***mm briskly constricting to ***mm and equally round and reactive to light  in a well lit room. EOMI. No nystagmus. No ptosis. V1-V3 intact B/L to light touch.{trace subtle:92782::\"No pathological facial asymmetry.\"} {nasolabial fold:21197::\"No flattening of the nasolabial fold. \"}.  Hearing grossly intact.  Tongue midline. No atrophy or fasiculations of the tongue noted. Palate and uvula elevate symmetrically.  Shoulder shrug symmetric.  - Fundoscopic exam:{Exam; eye fundus:209::\"normal w/o hemorrhages, exudates, or papilledema\"}.No attenuation. No pallor.  - Motor:  normal tone, normal bulk. No interosseous wasting. No flattening of hypothenar eminences.       Right Left     Motor Strength   Deltoids 5 5  Triceps 5 5  Biceps 5 5  Wrist Extensors 5 5   5 5   Hip Flexors 5 5   Knee extensors 5 5  Knee flexors 5 5  Plantar flexion 5 5  Dorsiflexion 5 5      Pronator drift: {Pronator drift:21302::\"No pronator drift\"}   Index Rolling: No orbiting.   Finger Taps: {finger taps:21303::\"Finger taps are symmetric in rate and amplitude. \"}   Rapid movements: {rapid movements:9369::\"Rapid/fine movements are symmetric. As expected their dominant hand is slightly faster.\"}   Leg Drift: None   Foot Taps: {foot taps:9370::\"Foot taps are symmetric.\"}      Asterixis: No asterixis noted.   Tremor:     Reflexes:    C5 C6 C7  L4 S1   R 2+ 2+  2+ 2+   L 2+ 2+  2+ 2+   Adductor Spread: {adductor spread:9371::\"No adductor spread noted.\"}    Frontal release signs:{frontal release signs:21320::\"Not assessed. \"}   Patricia's sign:{Responses; absent/present cva tenderness:707::\"absent\"}   Nonsustained clonus: Absent   Sustained clonus: Absent   - Sensory:   Light touch: normal  Temperature: normal  Vibration: normal  - Cerebellum: No truncal  ataxia. No titubations. No dysmetria, no dysdiadochokinesis. No overshoot.   - Gait/station: Normal gait and station. Symmetric arm swing.   - Plantar response: {EXAM; NEURO PED BABINSKI:75232::\"flexor bilaterally\"}    NIH Stroke Scale  Person Administering Scale: Farrukh Veras DO  1a  Level of consciousness: {NIHLOC:52055}   1b. LOC questions:  {NIHQUESTIONS:65277}   1c. LOC commands: {NIHCOMMANDS:99804}   2.  Best Gaze: {NIHGAZE:19889}   3.  Visual: {NIH vision:40733}   4. Facial Palsy: {NIHfacial:73807}   5a.  Motor left arm: {NIHARM:07694}   5b.  Motor right arm: {NIHARM:60523}   6a. motor left leg: {NIH le}   6b  Motor right leg:  {NIH le}   7. Limb Ataxia: {NIH ataxia:61953}   8.  Sensory: {NIH sensory:99374}   9. Best Language:  {NIH language:48764}   10. Dysarthria: {NIH dysarthria:66270}   11. Extinction and Inattention: {NIH neglect:28927}    Total:   {0-42:21101}     Modified [place] Score: {Modified Mack scale score:93198}                              Data reviewed    ECG findings by monitor or 12-lead:  Ecg:   Results for orders placed or performed during the hospital encounter of 25   EKG 12 Lead    Collection Time: 25  9:21 AM   Result Value Ref Range    Ventricular rate 74 BPM    Atrial rate 74 BPM    P-R Interval 142 ms    QRS Duration 144 ms    Q-T Interval 434 ms    QTC Calculation (Bezet) 481 ms    P Axis 50 degrees    R Axis 3 degrees    T Axis 18 degrees       Test results/Imaging:   Lab Results   Component Value Date/Time    TRIG 112.00 2021 09:02 AM    HDL 34 (L) 2021 09:02 AM    LDL 99 2021 09:02 AM    CHOLHDL 5 2021 09:02 AM     Recent Labs   Lab 25  0457   HGB 13.5     Recent Labs   Lab 25  0457      K 3.9      CO2 23.0   BUN 21     No results found for: \"A1C\"  Last A1c value was  % done  .          No valid procedures specified.  No valid procedures specified.  No valid procedures specified.  No valid procedures  specified.  No valid procedures specified.  No valid procedures specified.  No valid procedures specified.   CT STROKE CTA BRAIN/CTA NECK (W IV)(CPT=70496/33248)    Result Date: 3/7/2025  CONCLUSION:   No evidence of intracranial large vessel arterial occlusion or proximal flow limiting stenosis.  No evidence of occlusion, hemodynamically significant stenosis or evidence to suggest dissection of the bilateral cervical carotid and vertebral arteries.  Findings were discussed with the patient's RN Alvarez at 11:27 a.m. 2025.    Dictated by (CST): Johnny Martinez MD on 3/07/2025 at 11:21 AM     Finalized by (CST): Johnny Martinez MD on 3/07/2025 at 11:27 AM          CT STROKE BRAIN (NO IV)(CPT=70450)    Result Date: 3/7/2025  CONCLUSION:   No evidence of acute intracranial abnormality.  Moderate chronic microvascular white matter ischemia.  Slight thickening with aerated secretions ethmoid and sphenoid sinuses which can be seen with sinusitis.  Findings were discussed with patient's RN Alvarez at 11:18 a.m. 2025.    Dictated by (CST): Johnny Martinez MD on 3/07/2025 at 11:15 AM     Finalized by (CST): Johnny Martinez MD on 3/07/2025 at 11:19 AM           Performed an independent visualization of: {[facility] neuroimagin}   Imaging revealed: {imaging results (normal):9636::\"Agree with radiology read.\"}        74 year old male w/ a pmhx sig. for *** who ***    ***  Differential Diagnosis:  ***        Reperfusion therapy eligibility: patient is not eligible because: {notpanonmt:52874}  Agent and time of first antithrombotic administration (or contraindication):   {Antiplatelets:95023::\"Aspirin 325 once or rectal aspirin 300 once. Starting tomorrow Aspirin 81 mg daily or rectal aspirin 300 mg daily if still n.p.o.\"}  BP goal:   {BP Goal:71294}  Additional brain and vascular imaging ordered:   {migrainediagnostics:9634}  Cardiac imaging: {[facility] cardiac imagin}   Additional labs ordered:   Labs: {[facility] stroke  labs:9267}  Dysphagia status:   Dysphagia{Dysphagia:68200}  Fluids/nutrition:   {ivfstroke (Optional):26638::\"AVOID Hypotonic fluids in patients with acute ischemic stroke or cerebral edema.  Hypotonic fluids can worsen cerebral edema.  This includes D5 W, half-normal saline, and lactated Ringer's.  If patients need glucose then please use normal saline.\"}  DVT prophylaxis:   {PPx::p:\"SCD's while in bed\"}  Therapy/rehab services ordered (speech/PT/OT/rehab consult):   Physical therapy, Occupational Therapy, speech therapy evaluations ordered.   maintain oxygen saturation >94%. No supplemental oxygen  in nonhypoxic patients with acute ischemic stroke (AIS).  Tx hyperthermia (temperature >38°C) and identify source  Evidence indicates that persistent in-hospital hyperglycemia during the first 24 hours after AIS is associated with worse outcomes than normoglycemia and thus, it is reasonable to treat hyperglycemia to achieve blood glucose levels in a range of 140 to 180 mg/dL and to closely monitor to prevent hypoglycemia in patients with AIS.  Neuro checks Q{MODEL AMB 1-10:2556212852::\"4\"}.  Telemetry.NIH stroke scale per protocol.  Other:        Education/Instructions given to: {Persons; family members:{Persons; family members:658381}   Barriers to Learning:{BARRIERS TO EDUCATION:3997::\"None\"}  Content: Refer to note above.  Also provided education on recurrent stroke signs and symptoms, including but not limited to a facial droop, dysarthria, difficulty talking, word finding difficulties, weakness, falls, change in sensation. Pt should call 911 or be taken to the nearest emergency department if sx occur.  Evaluation/Outcome: {Verbalized understandin::\"Verbalized understanding\"}    This document is not intended to support charting by exception.  Sections left blank in a completed note should be presumed not to have been done.    Level of complexity was based on - interviewing, examining the patient,  establishing a plan of care, as well as review of all neuroimaging and cardiovascular studies.    Disclaimer:   This record was dictated using Dragon software. There may be errors due to voice recognition problems that were not realized and corrected during the completion of the note.       Thank you.  Farrukh Veras DO   Staff V[facility] Neurology

## 2025-03-07 NOTE — PROGRESS NOTES
RRT    *See RRT Documentation Record*    Reason the RRT was called: Patient Vaso vagal when trying to get up from bathroom, we sat him down safely and patient was unresponsive twice for a few seconds.   Assessment of patient leading up to RRT:  Patient walked with rolling walker to bathroom with assistance and sat him down since he needed to have a bowel movement.    Interventions/Testing: Vitals, Heart monitor, labs.    Patient Outcome/Disposition: We got patient back to bed safely with use of rolling chair and gait belt. Vitals were taken, within normal range. 500  bolus was given to patient, symptoms resolved.  Family Notified: no  Name of family notified: n/a

## 2025-03-07 NOTE — PROGRESS NOTES
[facility]  part of [facility]     DM Hospitalist Progress Note     PCP: Jonathon Palacio MD    CC: Follow up       Assessment/Plan:     Luis Antonio Pavon is a 74 year old male with PMH sig for GERD, known right bundle branch block, prior skin cancer with yearly skin checks who presented for right total knee arthroplasty.  Course complicated by syncopal episode x 2 and brief altered mental status     Right total knee arthroplasty  - As needed IV p.o. pain meds for postop pain control  - Monitor acute blood loss anemia as expected, postop hemoglobin stable  - PT OT, Ortho     Syncopal episodes x 2, altered mental status  - Likely secondary to low blood pressure, suspect vasovagal due to pain  - Neurology eval, CT CTA unrevealing  - Will monitor on telemetry and get echo, further workup pending course  -Troponin negative and EKG with sinus rhythm, known right bundle branch block    History of right bundle branch block  - No chest pain or shortness of breath, tolerated surgery well     FEN: IV fluids protocol, lites in a.m., diet as tolerated     PPx: Aspirin     Dispo: Full code pending Course    Questions/concerns were discussed with patient and/or family by bedside.    Note: This chart was prepared using voice recognition software and may contain unintended word substitution errors.     Discussed with wife at bedside    Thank You,  Kathy Gonzalez M.D.  [facility]  Answering Service: [phone]        Subjective     Overnight had a syncopal episode with staff when he was getting up to go to the bathroom.  Never hit his head or fell in mental status returned to normal within a few seconds.  It is on the morning prior to PT and he was feeling well but did have more pain in his leg.  Then when he was up with PT he got dizzy lightheaded and hypotensive.  Was put in a wheelchair and when I came over to talk to him he was unable to answer any questions, RRT was called and got patient back  into bed.  Mental status already returning back to baseline prior to going down for CT.  Neuroimaging unremarkable, no events on telemetry, not orthostatic and EKG with known right bundle branch block.      Objective     OBJECTIVE:  Temp:  [96.7 °F (35.9 °C)-97.3 °F (36.3 °C)] 96.7 °F (35.9 °C)  Pulse:  [56-77] 68  Resp:  [16-18] 18  BP: (100-142)/(55-95) 117/95  SpO2:  [94 %-100 %] 100 %    Intake/Output:  No intake or output data in the 24 hours ending 03/07/25 1458    Last 3 Weights   03/06/25 0604 231 lb (104.8 kg)   02/28/25 1151 227 lb (103 kg)   10/13/21 0758 234 lb (106.1 kg)   04/12/21 0811 231 lb (104.8 kg)       Exam  Gen: No acute distress, alert and oriented x3  Neck Supple, no JVD  Pulm: Lungs clear, normal respiratory effort, No wheezing or crackles  CV: Heart with regular rate and rhythm, No murmurs, rubs, gallops  Abd: Abdomen soft, nontender, nondistended, no organomegaly, bowel sounds present  MSK:  right knee as expected  Skin: no rashes or lesions, well perfused  Psych: mood stable, cooperative  Neuro: no focal deficits    Initially during RRT patient was awake but would not answer questions, unclear if he could not form the words or did not know the answer.  Once he got back to bed his mental status normalized.    Medications      labetalol  10 mg Intravenous Once    aspirin  81 mg Oral BID    sennosides  17.2 mg Oral Nightly    docusate sodium  100 mg Oral BID    famotidine  20 mg Oral BID    Or    famotidine  20 mg Intravenous BID    acetaminophen  1,000 mg Oral Q8H    fluticasone propionate  2 spray Nasal Daily    cetirizine  10 mg Oral Daily      sodium chloride         sodium chloride    polyethylene glycol (PEG 3350)    magnesium hydroxide    bisacodyl    fleet enema    ondansetron    metoclopramide    diphenhydrAMINE **OR** diphenhydrAMINE    HYDROcodone-acetaminophen    morphINE **OR** morphINE **OR** morphINE    Data Review:       Labs:     Recent Labs   Lab 03/07/25  2657  03/07/25  1135   WBC  --  6.4   HGB 13.5 12.7*   MCV  --  92.6   PLT  --  120.0*   INR  --  1.27*       Recent Labs   Lab 03/07/25  0457 03/07/25  1135     --    K 3.9  --      --    CO2 23.0  --    BUN 21  --    CREATSERUM 0.91  --    CA 8.5*  --    * 126*       No results for input(s): \"ALT\", \"AST\", \"ALB\", \"AMYLASE\", \"LIPASE\", \"LDH\" in the last 168 hours.    Invalid input(s): \"ALPHOS\", \"TBIL\", \"DBIL\", \"TPROT\"    Recent Labs   Lab 03/07/25  1050   PGLU 118*       No results for input(s): \"TROP\" in the last 168 hours.    Imaging:  CT STROKE CTA BRAIN/CTA NECK (W IV)(CPT=70496/61933)    Result Date: 3/7/2025  PROCEDURE: CT STROKE CTA BRAIN/CTA NECK (WIV) (CPT=70496/58326)  COMPARISON: None.  INDICATIONS: ams, syncope, aphasic  TECHNIQUE: CT images of the neck and brain were obtained with non-ionic intravenous contrast material. Multi-planar reformatted and 3-D images were created with vessel analysis performed on an independent workstation. Automated exposure control for dose reduction was used. Evaluation of internal carotid stenosis is based on NASCET Criteria.    FINDINGS: CT ANGIOGRAPHY OF THE NECK:  CAROTID ARTERIES: RIGHT COMMON CAROTID: No hemodynamically significant stenosis or dissection. RIGHT INTERNAL CAROTID: Mild atherosclerosis bifurcation.  No hemodynamically significant stenosis or dissection.  LEFT COMMON CAROTID: No hemodynamically significant stenosis or dissection. LEFT INTERNAL CAROTID: Mild atherosclerosis bifurcation.  No hemodynamically significant stenosis or dissection.  VERTEBRAL ARTERIES: RIGHT VERTEBRAL ARTERY: No hemodynamically significant stenosis or dissection. LEFT VERTEBRAL ARTERY: No hemodynamically significant stenosis or dissection.  CT ANGIOGRAPHY OF THE BRAIN:  ANTERIOR CIRCULATION: DISTAL INTERNAL CAROTIDS: Flow identified. No significant stenosis. ANTERIOR CEREBRALS: Flow identified. No significant stenosis. MIDDLE CEREBRALS: Flow identified. No significant  stenosis.  POSTERIOR CIRCULATION: RIGHT VERTEBRAL: Flow identified. No significant stenosis. LEFT VERTEBRAL: Flow identified. No significant stenosis. BASILAR: Flow identified. No significant stenosis. POSTERIOR CEREBRALS: Flow identified. No significant stenosis.  ANEURYSMS: None. VASCULAR MALFORMATIONS: None. OTHER: Negative.     AORTIC ARCH (Limited): No aneurysm or dissection is identified in the imaged volume.  MEDIASTINUM/APICES (Limited): No mass or adenopathy.  OTHER:  Visualized neck soft tissues grossly unremarkable.  Multilevel cervical spine degenerative change.          CONCLUSION:   No evidence of intracranial large vessel arterial occlusion or proximal flow limiting stenosis.  No evidence of occlusion, hemodynamically significant stenosis or evidence to suggest dissection of the bilateral cervical carotid and vertebral arteries.  Findings were discussed with the patient's RN Alvarez at 11:27 a.m. 03/07/2025.    Dictated by (CST): Johnny Martinez MD on 3/07/2025 at 11:21 AM     Finalized by (CST): Johnny Martinez MD on 3/07/2025 at 11:27 AM          CT STROKE BRAIN (NO IV)(CPT=70450)    Result Date: 3/7/2025  PROCEDURE: CT STROKE BRAIN (NO IV) (CPT=70450)  COMPARISON: None.  INDICATIONS: AMS, syncope  TECHNIQUE:   CT images were obtained without contrast material.  Automated exposure control for dose reduction was used.  Dose information is transmitted to the ACR (American College of Radiology) NRDR (National Radiology Data Registry) which includes the Dose Index Registry.  FINDINGS:  CSF SPACES: Ventricles, cisterns, and sulci are appropriate for age.  No hydrocephalus, subarachnoid hemorrhage, or mass.  No midline shift.  Cavum septum pellucidum Advair again noted. CEREBRUM: No edema, hemorrhage, mass, acute infarction, or significant atrophy.  WHITE MATTER: Moderate decreased attenuation bilateral white matter likely chronic microvascular white matter ischemia. CEREBELLUM: No edema, hemorrhage,  mass, acute infarction, or significant atrophy.  BRAINSTEM: No edema, hemorrhage, mass, acute infarction, or significant atrophy.  CALVARIUM: No apparent fracture, mass, or other significant visible lesion.  SINUSES: Consult thickening of the paranasal sinuses particularly the ethmoid and sphenoid sinuses noted with aerated secretions. ORBITS: Prior bilateral cataract surgery. OTHER: Negative.          CONCLUSION:   No evidence of acute intracranial abnormality.  Moderate chronic microvascular white matter ischemia.  Slight thickening with aerated secretions ethmoid and sphenoid sinuses which can be seen with sinusitis.  Findings were discussed with patient's RN Alvarez at 11:18 a.m. 03/07/2025.    Dictated by (CST): Johnny Martinez MD on 3/07/2025 at 11:15 AM     Finalized by (CST): Johnny Martinez MD on 3/07/2025 at 11:19 AM          XR KNEE (1 OR 2 VIEWS), RIGHT (CPT=73560)    Result Date: 3/6/2025  PROCEDURE: XR KNEE (1 OR 2 VIEWS), RIGHT (CPT=73560)  COMPARISON: None.  INDICATIONS: s/p right tka  TECHNIQUE: 2 views were obtained.   FINDINGS:   The patient is status post recent right knee arthroplasty with femoral, tibial, and patellar components in expected configuration.  There is edema and subcutaneous emphysema in the surrounding soft tissues compatible with recent postsurgical state.         CONCLUSION: Expected postsurgical changes status post recent right knee arthroplasty.    Dictated by (CST): Javi Renteria MD on 3/06/2025 at 10:27 AM     Finalized by (CST): Javi Renteria MD on 3/06/2025 at 10:27 AM

## 2025-03-07 NOTE — PLAN OF CARE
POD:1. Patient is A&Ox4. RA. Ancef completed overnight. Saline locked. Aspirin and scds for dvt prophylaxis. Ice gel wrap. Voiding freely. PRN Oxycodone for pain management. Up by 1/w. Call light within reach, frequent rounding. Safety measures in place. Plan for HH when medically clear.     Problem: Patient Centered Care  Goal: Patient preferences are identified and integrated in the patient's plan of care  Description: Interventions:  - What would you like us to know as we care for you?   - Provide timely, complete, and accurate information to patient/family  - Incorporate patient and family knowledge, values, beliefs, and cultural backgrounds into the planning and delivery of care  - Encourage patient/family to participate in care and decision-making at the level they choose  - Honor patient and family perspectives and choices  Outcome: Progressing     Problem: Patient/Family Goals  Goal: Patient/Family Long Term Goal  Description: Patient's Long Term Goal:     Interventions:  -   - See additional Care Plan goals for specific interventions  Outcome: Progressing  Goal: Patient/Family Short Term Goal  Description: Patient's Short Term Goal:     Interventions:   -   - See additional Care Plan goals for specific interventions  Outcome: Progressing

## 2025-03-07 NOTE — PHYSICAL THERAPY NOTE
PHYSICAL THERAPY TREATMENT NOTE - INPATIENT     Room Number: 418/418-A       Presenting Problem: s/p R TKA  Co-Morbidities :  Cancer (HCC)    skin cancer   Chondromalacia of patella    Log Date: 02/18/2013    Colon polyp   Dislocation of shoulder    bilat mult times, sees ortho prn   ED (erectile dysfunction)   GERD (gastroesophageal reflux disease)    uses tums 2-3 times weekly, fermin after acidic foods. no trouble swallowing   Hematuria    work up by Dr. Caraballo negative   Hemorrhoids    better with surgery   History of bilateral inguinal hernia repair    saw Sapna, surgery fall 2016   Living will in place   Low HDL (under 40)   Nasal congestion    saw ENT   Need for hepatitis C screening test    neg   Normal eye exam    at [facility], will go 2021   Osteoarthritis   RBBB    new from 2010, asyptomatic, no further w/u indicated,  discussed with pt ( was nl 2/22/10)   Screening for cardiovascular condition    normal CGXT with EF of 52%   Screening for skin condition    Dr. Sena, sees yearly    Problem List  Active Problems:    S/P total knee arthroplasty, right    Preop testing      PHYSICAL THERAPY ASSESSMENT   Patient demonstrates fair progress this session, goals  remain in progress.      Patient is requiring contact guard assist and minimal assist as a result of the following impairments: decreased functional strength, decreased endurance/aerobic capacity, pain, decreased muscular endurance, medical status, and limited R knee ROM.     Patient continues to function below baseline with bed mobility, transfers, gait, and stair negotiation.  Next session anticipate patient to progress bed mobility, transfers, gait, and stair negotiation.  Physical Therapy will continue to follow patient for duration of hospitalization.    Patient continues to benefit from continued skilled PT services: at discharge to promote prior level of function and safety with additional support and return home with home health PT.    PLAN DURING  HOSPITALIZATION  Nursing Mobility Recommendation : 1 Assist     PT Treatment Plan: Bed mobility, Body mechanics, Coordination, Endurance, Energy conservation, Patient education, Family education, Gait training, Neuromuscular re-educate, Range of motion, Strengthening, Stair training, Balance training  Frequency (Obs): BID     SUBJECTIVE  I feel a little dizzy    OBJECTIVE  Precautions: Bed/chair alarm    WEIGHT BEARING RESTRICTION  R Lower Extremity: Weight Bearing as Tolerated      PAIN ASSESSMENT   Ratin  Location: R knee  Management Techniques: Activity promotion, Body mechanics, Breathing techniques    BALANCE  Static Sitting: Good  Dynamic Sitting: Fair +  Static Standing: Fair  Dynamic Standing: Fair -    ACTIVITY TOLERANCE  Pulse: 66 (64 in sitting reporting dizziness)  Heart Rate Source: Monitor     BP: 130/80 (81/49 in sitting, reporting dizziness in therapy gym)  BP Location: Right arm  BP Method: Automatic  Patient Position: Semi-Davila     O2 WALK  Oxygen Therapy  SPO2% on Room Air at Rest: 99  SPO2% Ambulation on Room Air: 96    AM-PAC '6-Clicks' INPATIENT SHORT FORM - BASIC MOBILITY  How much difficulty does the patient currently have...  Patient Difficulty: Turning over in bed (including adjusting bedclothes, sheets and blankets)?: A Little   Patient Difficulty: Sitting down on and standing up from a chair with arms (e.g., wheelchair, bedside commode, etc.): A Little   Patient Difficulty: Moving from lying on back to sitting on the side of the bed?: A Little   How much help from another person does the patient currently need...   Help from Another: Moving to and from a bed to a chair (including a wheelchair)?: A Little   Help from Another: Need to walk in hospital room?: A Little   Help from Another: Climbing 3-5 steps with a railing?: A Little     AM-PAC Score:  Raw Score: 18   Approx Degree of Impairment: 46.58%   Standardized Score (AM-PAC Scale): 43.63   CMS Modifier (G-Code): CK    FUNCTIONAL  ABILITY STATUS  Functional Mobility/Gait Assessment  Gait Assistance: Contact guard assist  Distance (ft): 15 ft + 5 ft  Assistive Device: Rolling walker  Pattern: Shuffle, R Decreased stance time (slow pace, decreased step length, step to  Supine to Sit: minimal assist for RLE management  Sit to Supine: maximum assist x2 (2/2 medical status)  Sit to Stand: contact guard assist to RW    Skilled Therapy Provided: Pt rec'd in bed with wife at bedside, agreeable to therapy, identified by name and , gait belt donned for mobility. PT Tech present to assist with mobility. Pt denies dizziness at rest and with initial mobility during session. Vitals checked and stable (see values above). Pt required Min A for bed mobility mainly for RLE management. Pt able to perform STS transfers with initial CGA progressing to SBA. Ambulated short distances to WC with RW and CGA, slow but steady pace, denies dizziness. Pt transported to therapy gym via WC to trial stairs. Once in standing in gym pt reporting dizziness, sat down to , vitals checked, BP dropped to 81/49 mmHg, diaphoretic and lethargic, transported back to room, RN alerted, continued to endorse dizziness in chair. Once back in room pt became minimally responsive, with L facial droop, head extended and convulsing with MD gisselle present and called code stroke. Pt back to bed with Max A x2 and handed off to staff for CT. Plan to progress mobility in preparation for discharge once medically appropriate for OOB mobility.       The patient's Approx Degree of Impairment: 46.58% has been calculated based on documentation in the [facility] '6 clicks' Inpatient Daily Activity Short Form.  Research supports that patients with this level of impairment may benefit from HHPT.  Final disposition will be made by interdisciplinary medical team.      Patient End of Session: In bed, With  staff, Needs met, RN aware of session/findings, Family present    CURRENT GOALS   Goals to be met by:  3/20/25  Patient Goal Patient's self-stated goal is: not stated   Goal #1 Patient is able to demonstrate supine - sit EOB @ level: modified independent      Goal #1   Current Status  in progress   Goal #2 Patient is able to demonstrate transfers Sit to/from Stand at assistance level: modified independent      Goal #2  Current Status  in progress   Goal #3 Patient is able to ambulate 300 feet with assistive device at assistance level: modified independent    Goal #3   Current Status  in progress   Goal #4 Patient will negotiate 16 stairs/one curb w/ assistive device and supervision   Goal #4   Current Status  in progress   Goal #5  AROM 0 degrees extension to 95 degrees flexion     Goal #5   Current Status  in progress   Goal #6 Patient independently performs home exercise program for ROM/strengthening per the instructions provided in preparation for discharge.   Goal #6  Current Status  ongoing      Gait Trainin minutes  Therapeutic Activity: 30 minutes

## 2025-03-07 NOTE — PHYSICAL THERAPY NOTE
PHYSICAL THERAPY TREATMENT NOTE - INPATIENT     Room Number: 418/418-A       Presenting Problem: s/p R TKA  Co-Morbidities :  Cancer (HCC)    skin cancer   Chondromalacia of patella    Log Date: 02/18/2013    Colon polyp   Dislocation of shoulder    bilat mult times, sees ortho prn   ED (erectile dysfunction)   GERD (gastroesophageal reflux disease)    uses tums 2-3 times weekly, fermin after acidic foods. no trouble swallowing   Hematuria    work up by Dr. Caraballo negative   Hemorrhoids    better with surgery   History of bilateral inguinal hernia repair    saw Sapna, surgery fall 2016   Living will in place   Low HDL (under 40)   Nasal congestion    saw ENT   Need for hepatitis C screening test    neg   Normal eye exam    at [facility], will go 2021   Osteoarthritis   RBBB    new from 2010, asyptomatic, no further w/u indicated,  discussed with pt ( was nl 2/22/10)   Screening for cardiovascular condition    normal CGXT with EF of 52%   Screening for skin condition    Dr. Sena, sees yearly    Problem List  Active Problems:    S/P total knee arthroplasty, right    Preop testing      PHYSICAL THERAPY ASSESSMENT   Patient demonstrates fair progress this session, goals  remain in progress.      Patient is requiring minimal assist as a result of the following impairments: decreased functional strength, decreased endurance/aerobic capacity, pain, impaired standing dynamic balance, decreased muscular endurance, medical status, and limited R knee ROM.     Patient continues to function below baseline with bed mobility, transfers, gait, and stair negotiation.  Next session anticipate patient to progress bed mobility, transfers, gait, and stair negotiation.  Physical Therapy will continue to follow patient for duration of hospitalization.    Patient continues to benefit from continued skilled PT services: at discharge to promote prior level of function and safety with additional support and return home with home health PT.    PLAN  DURING HOSPITALIZATION  Nursing Mobility Recommendation : 1 Assist     PT Treatment Plan: Bed mobility, Body mechanics, Coordination, Endurance, Energy conservation, Patient education, Family education, Gait training, Neuromuscular re-educate, Range of motion, Strengthening, Stair training, Balance training  Frequency (Obs): Daily     SUBJECTIVE  I feel a little better than this morning    OBJECTIVE  Precautions: Bed/chair alarm    WEIGHT BEARING RESTRICTION  R Lower Extremity: Weight Bearing as Tolerated      PAIN ASSESSMENT   Ratin  Location: R knee  Management Techniques: Activity promotion, Body mechanics    BALANCE  Static Sitting: Good  Dynamic Sitting: Fair +  Static Standing: Not tested  Dynamic Standing: Not tested    ACTIVITY TOLERANCE  Pulse: 66 (64 in sitting reporting dizziness)  Heart Rate Source: Monitor     BP: 130/80 (81/49 in sitting, reporting dizziness in therapy gym)  BP Location: Right arm  BP Method: Automatic  Patient Position: Semi-Davila     O2 WALK  Oxygen Therapy  SPO2% on Room Air at Rest: 99  SPO2% Ambulation on Room Air: 96    AM-PAC '6-Clicks' INPATIENT SHORT FORM - BASIC MOBILITY  How much difficulty does the patient currently have...  Patient Difficulty: Turning over in bed (including adjusting bedclothes, sheets and blankets)?: A Little   Patient Difficulty: Sitting down on and standing up from a chair with arms (e.g., wheelchair, bedside commode, etc.): A Little   Patient Difficulty: Moving from lying on back to sitting on the side of the bed?: A Little   How much help from another person does the patient currently need...   Help from Another: Moving to and from a bed to a chair (including a wheelchair)?: A Little   Help from Another: Need to walk in hospital room?: A Little   Help from Another: Climbing 3-5 steps with a railing?: A Little     AM-PAC Score:  Raw Score: 18   Approx Degree of Impairment: 46.58%   Standardized Score (AM-PAC Scale): 43.63   CMS Modifier (G-Code):  CK    FUNCTIONAL ABILITY STATUS  Functional Mobility/Gait Assessment  Gait Assistance: Not tested      Skilled Therapy Provided: Pt rec'd in chair with family members at bedside. Pt ok to participate in bedside exercise per ortho PA until medical workup complete. Pt able to perform seated therex per TKA protocol with AAROM from therapist. Encouraged pt to perform again as tolerated throughout the day, call for staff assist back to bed.    The patient's Approx Degree of Impairment: 46.58% has been calculated based on documentation in the [facility] '6 clicks' Inpatient Daily Activity Short Form.  Research supports that patients with this level of impairment may benefit from HHPT.  Final disposition will be made by interdisciplinary medical team.    THERAPEUTIC EXERCISES  Lower Extremity Ankle pumps  Heel slides  LAQ  Leg slides  Quad sets     Position Sitting       Patient End of Session: Up in chair, Needs met, Call light within reach, RN aware of session/findings, Family present, Alarm set    CURRENT GOALS   Goals to be met by: 3/20/25  Patient Goal Patient's self-stated goal is: not stated   Goal #1 Patient is able to demonstrate supine - sit EOB @ level: modified independent      Goal #1   Current Status  in progress   Goal #2 Patient is able to demonstrate transfers Sit to/from Stand at assistance level: modified independent      Goal #2  Current Status  in progress   Goal #3 Patient is able to ambulate 300 feet with assistive device at assistance level: modified independent    Goal #3   Current Status  in progress   Goal #4 Patient will negotiate 16 stairs/one curb w/ assistive device and supervision   Goal #4   Current Status  in progress   Goal #5  AROM 0 degrees extension to 95 degrees flexion     Goal #5   Current Status  in progress   Goal #6 Patient independently performs home exercise program for ROM/strengthening per the instructions provided in preparation for discharge.   Goal #6  Current Status  ongoing       Therapeutic Exercise: 23 minutes

## 2025-03-08 LAB
ANION GAP SERPL CALC-SCNC: 7 MMOL/L (ref 0–18)
BASOPHILS # BLD AUTO: 0.03 X10(3) UL (ref 0–0.2)
BASOPHILS NFR BLD AUTO: 0.5 %
BUN BLD-MCNC: 13 MG/DL (ref 9–23)
BUN/CREAT SERPL: 18.8 (ref 10–20)
CALCIUM BLD-MCNC: 8.2 MG/DL (ref 8.7–10.4)
CHLORIDE SERPL-SCNC: 106 MMOL/L (ref 98–112)
CO2 SERPL-SCNC: 27 MMOL/L (ref 21–32)
CREAT BLD-MCNC: 0.69 MG/DL
DEPRECATED RDW RBC AUTO: 42.6 FL (ref 35.1–46.3)
EGFRCR SERPLBLD CKD-EPI 2021: 97 ML/MIN/1.73M2 (ref 60–?)
EOSINOPHIL # BLD AUTO: 0.33 X10(3) UL (ref 0–0.7)
EOSINOPHIL NFR BLD AUTO: 5.2 %
ERYTHROCYTE [DISTWIDTH] IN BLOOD BY AUTOMATED COUNT: 12.8 % (ref 11–15)
GLUCOSE BLD-MCNC: 95 MG/DL (ref 70–99)
HCT VFR BLD AUTO: 36.8 %
HGB BLD-MCNC: 12.8 G/DL
IMM GRANULOCYTES # BLD AUTO: 0.02 X10(3) UL (ref 0–1)
IMM GRANULOCYTES NFR BLD: 0.3 %
LYMPHOCYTES # BLD AUTO: 1.15 X10(3) UL (ref 1–4)
LYMPHOCYTES NFR BLD AUTO: 18 %
MCH RBC QN AUTO: 31.8 PG (ref 26–34)
MCHC RBC AUTO-ENTMCNC: 34.8 G/DL (ref 31–37)
MCV RBC AUTO: 91.5 FL
MONOCYTES # BLD AUTO: 0.96 X10(3) UL (ref 0.1–1)
MONOCYTES NFR BLD AUTO: 15 %
NEUTROPHILS # BLD AUTO: 3.91 X10 (3) UL (ref 1.5–7.7)
NEUTROPHILS # BLD AUTO: 3.91 X10(3) UL (ref 1.5–7.7)
NEUTROPHILS NFR BLD AUTO: 61 %
OSMOLALITY SERPL CALC.SUM OF ELEC: 290 MOSM/KG (ref 275–295)
PLATELET # BLD AUTO: 103 10(3)UL (ref 150–450)
POTASSIUM SERPL-SCNC: 3.8 MMOL/L (ref 3.5–5.1)
RBC # BLD AUTO: 4.02 X10(6)UL
SODIUM SERPL-SCNC: 140 MMOL/L (ref 136–145)
WBC # BLD AUTO: 6.4 X10(3) UL (ref 4–11)

## 2025-03-08 PROCEDURE — 85025 COMPLETE CBC W/AUTO DIFF WBC: CPT | Performed by: HOSPITALIST

## 2025-03-08 PROCEDURE — 80048 BASIC METABOLIC PNL TOTAL CA: CPT | Performed by: HOSPITALIST

## 2025-03-08 PROCEDURE — 97530 THERAPEUTIC ACTIVITIES: CPT

## 2025-03-08 PROCEDURE — 97116 GAIT TRAINING THERAPY: CPT

## 2025-03-08 PROCEDURE — 97110 THERAPEUTIC EXERCISES: CPT

## 2025-03-08 NOTE — PLAN OF CARE
Papo is ambulating in room with walker and 1 assist to void. No c/o dizziness tonight when ambulating. Encouraged to maintain posture when ambulating to avoid feeling lightheaded. Remote tele monitoring maintained. Taking Norco as needed for pain control, also using ice gel packs. SCD', TARA's and ASA for DVT prevention.  Problem: Patient Centered Care  Goal: Patient preferences are identified and integrated in the patient's plan of care  Description: Interventions:  - What would you like us to know as we care for you?   - Provide timely, complete, and accurate information to patient/family  - Incorporate patient and family knowledge, values, beliefs, and cultural backgrounds into the planning and delivery of care  - Encourage patient/family to participate in care and decision-making at the level they choose  - Honor patient and family perspectives and choices  Outcome: Progressing     Problem: Patient/Family Goals  Goal: Patient/Family Long Term Goal  Description: Patient's Long Term Goal:     Interventions:  -   - See additional Care Plan goals for specific interventions  Outcome: Progressing  Goal: Patient/Family Short Term Goal  Description: Patient's Short Term Goal:     Interventions:   -  - See additional Care Plan goals for specific interventions  Outcome: Progressing     Problem: PAIN - ADULT  Goal: Verbalizes/displays adequate comfort level or patient's stated pain goal  Description: INTERVENTIONS:  - Encourage pt to monitor pain and request assistance  - Assess pain using appropriate pain scale  - Administer analgesics based on type and severity of pain and evaluate response  - Implement non-pharmacological measures as appropriate and evaluate response  - Consider cultural and social influences on pain and pain management  - Manage/alleviate anxiety  - Utilize distraction and/or relaxation techniques  - Monitor for opioid side effects  - Notify MD/LIP if interventions unsuccessful or patient reports new  pain  - Anticipate increased pain with activity and pre-medicate as appropriate  Outcome: Progressing     Problem: RISK FOR INFECTION - ADULT  Goal: Absence of fever/infection during anticipated neutropenic period  Description: INTERVENTIONS  - Monitor WBC  - Administer growth factors as ordered  - Implement neutropenic guidelines  Outcome: Progressing     Problem: SAFETY ADULT - FALL  Goal: Free from fall injury  Description: INTERVENTIONS:  - Assess pt frequently for physical needs  - Identify cognitive and physical deficits and behaviors that affect risk of falls.  - [place] fall precautions as indicated by assessment.  - Educate pt/family on patient safety including physical limitations  - Instruct pt to call for assistance with activity based on assessment  - Modify environment to reduce risk of injury  - Provide assistive devices as appropriate  - Consider OT/PT consult to assist with strengthening/mobility  - Encourage toileting schedule  Outcome: Progressing     Problem: DISCHARGE PLANNING  Goal: Discharge to home or other facility with appropriate resources  Description: INTERVENTIONS:  - Identify barriers to discharge w/pt and caregiver  - Include patient/family/discharge partner in discharge planning  - Arrange for needed discharge resources and transportation as appropriate  - Identify discharge learning needs (meds, wound care, etc)  - Arrange for interpreters to assist at discharge as needed  - Consider post-discharge preferences of patient/family/discharge partner  - Complete POLST form as appropriate  - Assess patient's ability to be responsible for managing their own health  - Refer to Case Management Department for coordinating discharge planning if the patient needs post-hospital services based on physician/LIP order or complex needs related to functional status, cognitive ability or social support system  Outcome: Progressing

## 2025-03-08 NOTE — PROGRESS NOTES
[facility]  part of [facility]     [facility] Hospitalist Progress Note     PCP: Jonathon Palacio MD    CC: Follow up       Assessment/Plan:     Luis Antonio Pavon is a 74 year old male with PMH sig for GERD, known right bundle branch block, prior skin cancer with yearly skin checks who presented for right total knee arthroplasty.  Course complicated by syncopal episode x 2 and brief altered mental status.   CVA workup negative.  Echo stable.  Symptoms improved.  Orthostatics negative on 3/7/2025.  Repeat PT session and possible DC home tomorrow     Right total knee arthroplasty  - As needed IV p.o. pain meds for postop pain control  - Monitor acute blood loss anemia as expected, postop hemoglobin stable->12.7->12.8, check PRN   - PT OT, Ortho     Syncopal episodes x 2, altered mental status  - Likely secondary to low blood pressure, suspect vasovagal due to pain  - Neurology eval, CT CTA unrevealing  - no events ont niall   -echo unrevealing  -Troponin negative and EKG with sinus rhythm, known right bundle branch block    History of right bundle branch block  - No chest pain or shortness of breath, tolerated surgery well     FEN: IV fluids protocol, lites in a.m., diet as tolerated     PPx: Aspirin     Dispo: Full code pending Course    Questions/concerns were discussed with patient and/or family by bedside.    Note: This chart was prepared using voice recognition software and may contain unintended word substitution errors.     Discussed with wife at bedside. Dw charge RN for bed extender, all being used currently, transport is aware and team will keep calling to see if one becomes available.      Thank You,  Kathy Gonzalez M.D.  [facility] [place]  Answering Service: [phone]        Subjective     Doing better today, still pretty stiff.  Not ready for home yet.  No cp or SOB. Toelartying diet          Objective     OBJECTIVE:  Temp:  [96.8 °F (36 °C)-97.3 °F (36.3 °C)] 97.3 °F (36.3 °C)  Pulse:   [81-88] 88  Resp:  [8-18] 8  BP: (129-150)/(73-90) 129/73  SpO2:  [93 %-97 %] 93 %    Intake/Output:    Intake/Output Summary (Last 24 hours) at 3/8/2025 1403  Last data filed at 3/8/2025 0655  Gross per 24 hour   Intake --   Output 2075 ml   Net -2075 ml       Last 3 Weights   03/06/25 0604 231 lb (104.8 kg)   02/28/25 1151 227 lb (103 kg)   10/13/21 0758 234 lb (106.1 kg)   04/12/21 0811 231 lb (104.8 kg)       Exam  Gen: No acute distress, alert and oriented x3  Neck Supple, no JVD  Pulm: Lungs clear, normal respiratory effort, No wheezing or crackles  CV: Heart with regular rate and rhythm, No murmurs, rubs, gallops  Abd: Abdomen soft, nontender, nondistended, no organomegaly, bowel sounds present  MSK:  right knee as expected  Skin: no rashes or lesions, well perfused  Psych: mood stable, cooperative  Neuro: no focal deficits       Medications      aspirin  81 mg Oral BID    sennosides  17.2 mg Oral Nightly    docusate sodium  100 mg Oral BID    famotidine  20 mg Oral BID    Or    famotidine  20 mg Intravenous BID    fluticasone propionate  2 spray Nasal Daily    cetirizine  10 mg Oral Daily           polyethylene glycol (PEG 3350)    magnesium hydroxide    bisacodyl    fleet enema    ondansetron    metoclopramide    diphenhydrAMINE **OR** diphenhydrAMINE    HYDROcodone-acetaminophen    Data Review:       Labs:     Recent Labs   Lab 03/07/25 0457 03/07/25 1135 03/08/25  0500   WBC  --  6.4 6.4   HGB 13.5 12.7* 12.8*   MCV  --  92.6 91.5   PLT  --  120.0* 103.0*   INR  --  1.27*  --        Recent Labs   Lab 03/07/25 0457 03/07/25 1135 03/08/25  0500     --  140   K 3.9  --  3.8     --  106   CO2 23.0  --  27.0   BUN 21  --  13   CREATSERUM 0.91  --  0.69*   CA 8.5*  --  8.2*   * 126* 95       No results for input(s): \"ALT\", \"AST\", \"ALB\", \"AMYLASE\", \"LIPASE\", \"LDH\" in the last 168 hours.    Invalid input(s): \"ALPHOS\", \"TBIL\", \"DBIL\", \"TPROT\"    Recent Labs   Lab 03/07/25  1050   PGLU 118*        No results for input(s): \"TROP\" in the last 168 hours.    Imaging:  CT STROKE CTA BRAIN/CTA NECK (W IV)(CPT=70496/19753)    Result Date: 3/7/2025  PROCEDURE: CT STROKE CTA BRAIN/CTA NECK (WIV) (CPT=70496/61052)  COMPARISON: None.  INDICATIONS: ams, syncope, aphasic  TECHNIQUE: CT images of the neck and brain were obtained with non-ionic intravenous contrast material. Multi-planar reformatted and 3-D images were created with vessel analysis performed on an independent workstation. Automated exposure control for dose reduction was used. Evaluation of internal carotid stenosis is based on NASCET Criteria.    FINDINGS: CT ANGIOGRAPHY OF THE NECK:  CAROTID ARTERIES: RIGHT COMMON CAROTID: No hemodynamically significant stenosis or dissection. RIGHT INTERNAL CAROTID: Mild atherosclerosis bifurcation.  No hemodynamically significant stenosis or dissection.  LEFT COMMON CAROTID: No hemodynamically significant stenosis or dissection. LEFT INTERNAL CAROTID: Mild atherosclerosis bifurcation.  No hemodynamically significant stenosis or dissection.  VERTEBRAL ARTERIES: RIGHT VERTEBRAL ARTERY: No hemodynamically significant stenosis or dissection. LEFT VERTEBRAL ARTERY: No hemodynamically significant stenosis or dissection.  CT ANGIOGRAPHY OF THE BRAIN:  ANTERIOR CIRCULATION: DISTAL INTERNAL CAROTIDS: Flow identified. No significant stenosis. ANTERIOR CEREBRALS: Flow identified. No significant stenosis. MIDDLE CEREBRALS: Flow identified. No significant stenosis.  POSTERIOR CIRCULATION: RIGHT VERTEBRAL: Flow identified. No significant stenosis. LEFT VERTEBRAL: Flow identified. No significant stenosis. BASILAR: Flow identified. No significant stenosis. POSTERIOR CEREBRALS: Flow identified. No significant stenosis.  ANEURYSMS: None. VASCULAR MALFORMATIONS: None. OTHER: Negative.     AORTIC ARCH (Limited): No aneurysm or dissection is identified in the imaged volume.  MEDIASTINUM/APICES (Limited): No mass or adenopathy.   OTHER:  Visualized neck soft tissues grossly unremarkable.  Multilevel cervical spine degenerative change.          CONCLUSION:   No evidence of intracranial large vessel arterial occlusion or proximal flow limiting stenosis.  No evidence of occlusion, hemodynamically significant stenosis or evidence to suggest dissection of the bilateral cervical carotid and vertebral arteries.  Findings were discussed with the patient's RN Alvarez at 11:27 a.m. 03/07/2025.    Dictated by (CST): Johnny Martinez MD on 3/07/2025 at 11:21 AM     Finalized by (CST): Johnny Martinez MD on 3/07/2025 at 11:27 AM          CT STROKE BRAIN (NO IV)(CPT=70450)    Result Date: 3/7/2025  PROCEDURE: CT STROKE BRAIN (NO IV) (CPT=70450)  COMPARISON: None.  INDICATIONS: AMS, syncope  TECHNIQUE:   CT images were obtained without contrast material.  Automated exposure control for dose reduction was used.  Dose information is transmitted to the ACR (American College of Radiology) NRDR (National Radiology Data Registry) which includes the Dose Index Registry.  FINDINGS:  CSF SPACES: Ventricles, cisterns, and sulci are appropriate for age.  No hydrocephalus, subarachnoid hemorrhage, or mass.  No midline shift.  Cavum septum pellucidum Advair again noted. CEREBRUM: No edema, hemorrhage, mass, acute infarction, or significant atrophy.  WHITE MATTER: Moderate decreased attenuation bilateral white matter likely chronic microvascular white matter ischemia. CEREBELLUM: No edema, hemorrhage, mass, acute infarction, or significant atrophy.  BRAINSTEM: No edema, hemorrhage, mass, acute infarction, or significant atrophy.  CALVARIUM: No apparent fracture, mass, or other significant visible lesion.  SINUSES: Consult thickening of the paranasal sinuses particularly the ethmoid and sphenoid sinuses noted with aerated secretions. ORBITS: Prior bilateral cataract surgery. OTHER: Negative.          CONCLUSION:   No evidence of acute intracranial abnormality.  Moderate  chronic microvascular white matter ischemia.  Slight thickening with aerated secretions ethmoid and sphenoid sinuses which can be seen with sinusitis.  Findings were discussed with patient's RN Alavrez at 11:18 a.m. 03/07/2025.    Dictated by (CST): Johnny Martinez MD on 3/07/2025 at 11:15 AM     Finalized by (CST): Johnny Martinez MD on 3/07/2025 at 11:19 AM          XR KNEE (1 OR 2 VIEWS), RIGHT (CPT=73560)    Result Date: 3/6/2025  PROCEDURE: XR KNEE (1 OR 2 VIEWS), RIGHT (CPT=73560)  COMPARISON: None.  INDICATIONS: s/p right tka  TECHNIQUE: 2 views were obtained.   FINDINGS:   The patient is status post recent right knee arthroplasty with femoral, tibial, and patellar components in expected configuration.  There is edema and subcutaneous emphysema in the surrounding soft tissues compatible with recent postsurgical state.         CONCLUSION: Expected postsurgical changes status post recent right knee arthroplasty.    Dictated by (CST): Javi Renteria MD on 3/06/2025 at 10:27 AM     Finalized by (CST): Javi Renteria MD on 3/06/2025 at 10:27 AM

## 2025-03-08 NOTE — PROGRESS NOTES
[facility]  Progress Note    Luis Antonio Pavon Patient Status:  Outpatient in a Bed    3/10/1950 MRN [id number]   Location [facility] 4W/SW/SE Attending Angel Smith MD   Hosp Day # 0 PCP Jonathon Palacio MD     SUBJECTIVE:  Interval History: S/p Right total knee arthroplasty with Dr. Smith POD 2. Patient has no current complaints. He states his pain is controlled and he is feeling much better since his vasovagal episode. He had gotten up to the bathroom with a walker and assistance overnight.   Patient denies chest pain, shortness of breath and calf pain.    Post-Op Day: 2    OBJECTIVE:  Blood pressure 150/90, pulse 86, temperature 97.3 °F (36.3 °C), temperature source Oral, resp. rate 18, height 6' 2\" (1.88 m), weight 231 lb (104.8 kg), SpO2 95%.     Ortho Exam:  Dressing is clean and dry.  2+ dorsalis pedis and posterior tibialis pulses.  Sensation is intact distally.  + DF/PF/EHL.  Calf is soft and nontender. Negative Marifer's test.        Data Review:  Recent Labs   Lab 25  0457 25  1135 25  0500   RBC  --  3.77* 4.02   HGB 13.5 12.7* 12.8*   HCT  --  34.9* 36.8*   MCV  --  92.6 91.5   MCH  --  33.7 31.8   MCHC  --  36.4 34.8   RDW  --  12.7 12.8   NEPRELIM  --  4.03 3.91   WBC  --  6.4 6.4   PLT  --  120.0* 103.0*       Postop XR reviewed.    Narrative   PROCEDURE: XR KNEE (1 OR 2 VIEWS), RIGHT (CPT=73560)     COMPARISON: None.     INDICATIONS: s/p right tka     TECHNIQUE: 2 views were obtained.       FINDINGS:     The patient is status post recent right knee arthroplasty with femoral, tibial, and patellar components in expected configuration.     There is edema and subcutaneous emphysema in the surrounding soft tissues compatible with recent postsurgical state.       ASSESSMENT/PLAN:  S/p RTKA POD 2  1) Continue pain control with Norco 10 q 6 hours PRN  2) Continue DVT prophylaxis with ASA 81mg BID  3) Continue PT/OT  4) Dispo: Pending PT/OT and medically stable    Gayathri  JORGE Townsend  3/8/2025  6:29 AM

## 2025-03-08 NOTE — PHYSICAL THERAPY NOTE
PHYSICAL THERAPY KNEE TREATMENT NOTE - INPATIENT     Room Number: 418/418-A             Presenting Problem: s/p R TKA  Co-Morbidities :  Cancer (HCC)    skin cancer   Chondromalacia of patella    Log Date: 02/18/2013    Colon polyp   Dislocation of shoulder    bilat mult times, sees ortho prn   ED (erectile dysfunction)   GERD (gastroesophageal reflux disease)    uses tums 2-3 times weekly, fermin after acidic foods. no trouble swallowing   Hematuria    work up by Dr. Caraballo negative   Hemorrhoids    better with surgery   History of bilateral inguinal hernia repair    saw Sapna, surgery fall 2016   Living will in place   Low HDL (under 40)   Nasal congestion    saw ENT   Need for hepatitis C screening test    neg   Normal eye exam    at [facility], will go 2021   Osteoarthritis   RBBB    new from 2010, asyptomatic, no further w/u indicated,  discussed with pt ( was nl 2/22/10)   Screening for cardiovascular condition    normal CGXT with EF of 52%   Screening for skin condition    Dr. Sena, sees yearly    Problem List  Active Problems:    S/P total knee arthroplasty, right    Preop testing      PHYSICAL THERAPY ASSESSMENT   Patient demonstrates limited progress this session, goals  remain in progress.      Patient is requiring minimal assist as a result of the following impairments: decreased functional strength, decreased endurance/aerobic capacity, pain, decreased muscular endurance, and limited rt knee ROM.     Patient continues to function below baseline with transfers, gait, stair negotiation, and performing household tasks.  Next session anticipate patient to progress transfers, gait, and stair negotiation.  Physical Therapy will continue to follow patient for duration of hospitalization.    Patient continues to benefit from continued skilled PT services: at discharge to promote prior level of function and safety with additional support and return home with home health PT.    PLAN DURING HOSPITALIZATION  Nursing  Mobility Recommendation : 1 Assist     PT Treatment Plan: Endurance, Neuromuscular re-educate, Balance training, Transfer training, Body mechanics, Patient education, Family education  Frequency (Obs): Daily     SUBJECTIVE  Patient ready for PT session.     OBJECTIVE  Precautions: Bed/chair alarm    WEIGHT BEARING STATUS  R Lower Extremity: Weight Bearing as Tolerated      PAIN ASSESSMENT   Ratin  Location: rt knee  Management Techniques: Activity promotion, Body mechanics, Repositioning    BALANCE  Static Sitting: Good  Dynamic Sitting: Fair +  Static Standing: Fair  Dynamic Standing: Fair    ACTIVITY TOLERANCE  Pulse: 88  Heart Rate Source: Monitor  Resp: (!) 8  BP: 129/73  BP Location: Right arm  BP Method: Automatic  Patient Position: Sitting    O2 WALK  Oxygen Therapy  SPO2% on Room Air at Rest: 98    AM-PAC '6-Clicks' INPATIENT SHORT FORM - BASIC MOBILITY  How much difficulty does the patient currently have...  Patient Difficulty: Turning over in bed (including adjusting bedclothes, sheets and blankets)?: A Little   Patient Difficulty: Sitting down on and standing up from a chair with arms (e.g., wheelchair, bedside commode, etc.): A Little   Patient Difficulty: Moving from lying on back to sitting on the side of the bed?: A Lot   How much help from another person does the patient currently need...   Help from Another: Moving to and from a bed to a chair (including a wheelchair)?: A Little   Help from Another: Need to walk in hospital room?: A Little   Help from Another: Climbing 3-5 steps with a railing?: A Lot     AM-PAC Score:  Raw Score: 16   Approx Degree of Impairment: 54.16%   Standardized Score (AM-PAC Scale): 40.78   CMS Modifier (G-Code): CK    FUNCTIONAL ABILITY STATUS  Functional Mobility/Gait Assessment  Gait Assistance: Minimum assistance  Distance (ft): 30 x 3  Assistive Device: Rolling walker  Pattern: R Decreased stance time, Shuffle  Stairs: Stairs  How Many Stairs: 3  Device: 2  Rails  Assist: Moderate assist  Pattern: Ascend and Descend  Ascend and Descend : Step to  Rolling: minimal assist  Supine to Sit: minimal assist  Sit to Supine:  NT  Sit to Stand: moderate assist    Skilled Therapy Provided: chart reviewed and RN approved session. Patient in bed agreeable for therapy mobility, wife in room. Patient limited rt leg movements d/t inc pain, swelling and weakness. Patient  Min A for bed mobility, req assist with RT leg movements.  Patient sit at EOB for ~ 5 min without c/o.  Transfers with Min to Mod A for toilet and w/c. Patient amb with steady antalgic gait, shuffling small steps. Patient easily fatigue. Patient practiced 3 stairs navigation with Mod A with cues for safety, patient having difficulty with tolerate weight bearing on rt leg d/t inc pain  and weakness. Patient unsteady on stairs at times. Patient return to his room to bedside chair.  Patient presents rt quads weakness with difficulty to ext leg in sitting.     The patient's Approx Degree of Impairment: 54.16% has been calculated based on documentation in the [facility] '6 clicks' Inpatient Basic Mobility Short Form.  Research supports that patients with this level of impairment may benefit from  PT.  Final disposition will be made by interdisciplinary medical team.    Exercises AM Session PM Session   Ankle Pumps 25 reps  reps   Quad Sets 10 reps  reps   Glut Sets  reps  reps   Hip Abd/Add 15 reps  reps   Heel slides 15 reps  reps   Saq  reps  reps   SLR  reps  reps   Sitting Knee Flexion  15reps  reps   Standing heel/toe raises  reps  reps   Standing knee flexion  reps  reps   Extension stretch  1x 1x     Comments: Pt participated in group session, tolerance was .    Knee ROM   R Knee Flexion (degrees): 70  L Knee Flexion (degrees):  (WNL)  R Knee Extension (degrees): -7  L Knee Extension (degrees):  (WNL)    Patient End of Session: Up in chair, Family present, Hospital anti-slip socks, All patient questions and concerns  addressed, RN aware of session/findings, Call light within reach, Needs met    CURRENT GOALS  Goals to be met by: 3/20/25  Patient Goal Patient's self-stated goal is: not stated   Goal #1 Patient is able to demonstrate supine - sit EOB @ level: modified independent      Goal #1   Current Status  in progress   Goal #2 Patient is able to demonstrate transfers Sit to/from Stand at assistance level: modified independent      Goal #2  Current Status  in progress   Goal #3 Patient is able to ambulate 300 feet with assistive device at assistance level: modified independent    Goal #3   Current Status  in progress   Goal #4 Patient will negotiate 16 stairs/one curb w/ assistive device and supervision   Goal #4   Current Status  in progress   Goal #5  AROM 0 degrees extension to 95 degrees flexion     Goal #5   Current Status  in progress   Goal #6 Patient independently performs home exercise program for ROM/strengthening per the instructions provided in preparation for discharge.   Goal #6  Current Status  ongoing      Gait Trainin minutes  Therapeutic Activity: 10 minutes  Neuromuscular Re-education:  minutes  Therapeutic Exercise: 10 minutes  [place] Repositioning:  minutes  Manual Therapy:  minutes  Can add/delete any of these

## 2025-03-08 NOTE — PLAN OF CARE
Patient alert and oriented x4. On room air. VSS. SCD/ASA for dvt prophylaxis. Pain managed with prn Norco. Oxycodone discontinued. Fluids infusing. Plan for hoe with residential home health pending medical clearance.     Problem: Patient Centered Care  Goal: Patient preferences are identified and integrated in the patient's plan of care  Description: Interventions:  - Provide timely, complete, and accurate information to patient/family  - Incorporate patient and family knowledge, values, beliefs, and cultural backgrounds into the planning and delivery of care  - Encourage patient/family to participate in care and decision-making at the level they choose  - Honor patient and family perspectives and choices  Outcome: Progressing     Problem: PAIN - ADULT  Goal: Verbalizes/displays adequate comfort level or patient's stated pain goal  Description: INTERVENTIONS:  - Encourage pt to monitor pain and request assistance  - Assess pain using appropriate pain scale  - Administer analgesics based on type and severity of pain and evaluate response  - Implement non-pharmacological measures as appropriate and evaluate response  - Consider cultural and social influences on pain and pain management  - Manage/alleviate anxiety  - Utilize distraction and/or relaxation techniques  - Monitor for opioid side effects  - Notify MD/LIP if interventions unsuccessful or patient reports new pain  - Anticipate increased pain with activity and pre-medicate as appropriate  Outcome: Progressing     Problem: RISK FOR INFECTION - ADULT  Goal: Absence of fever/infection during anticipated neutropenic period  Description: INTERVENTIONS  - Monitor WBC  - Administer growth factors as ordered  - Implement neutropenic guidelines  Outcome: Progressing     Problem: SAFETY ADULT - FALL  Goal: Free from fall injury  Description: INTERVENTIONS:  - Assess pt frequently for physical needs  - Identify cognitive and physical deficits and behaviors that affect  risk of falls.  - [place] fall precautions as indicated by assessment.  - Educate pt/family on patient safety including physical limitations  - Instruct pt to call for assistance with activity based on assessment  - Modify environment to reduce risk of injury  - Provide assistive devices as appropriate  - Consider OT/PT consult to assist with strengthening/mobility  - Encourage toileting schedule  Outcome: Progressing     Problem: DISCHARGE PLANNING  Goal: Discharge to home or other facility with appropriate resources  Description: INTERVENTIONS:  - Identify barriers to discharge w/pt and caregiver  - Include patient/family/discharge partner in discharge planning  - Arrange for needed discharge resources and transportation as appropriate  - Identify discharge learning needs (meds, wound care, etc)  - Arrange for interpreters to assist at discharge as needed  - Consider post-discharge preferences of patient/family/discharge partner  - Complete POLST form as appropriate  - Assess patient's ability to be responsible for managing their own health  - Refer to Case Management Department for coordinating discharge planning if the patient needs post-hospital services based on physician/LIP order or complex needs related to functional status, cognitive ability or social support system  Outcome: Progressing

## 2025-03-08 NOTE — PLAN OF CARE
Patient alert and oriented x4. On room air. ASA/SCDs for DVT prophylaxis. Tele in place, no calls this shift. Hydrocodone for pain management. Aquacel to right knee intact. Ambulating stand by with walker. Moderate assist to get up from chair/toilet. Per physical therapy Patient was unsteady on stairs. Plan for Physical therapy tomorrow. Will try stairs again in hopes of discharging home with Home health     Problem: Patient Centered Care  Goal: Patient preferences are identified and integrated in the patient's plan of care  Description: Interventions:  - Provide timely, complete, and accurate information to patient/family  - Incorporate patient and family knowledge, values, beliefs, and cultural backgrounds into the planning and delivery of care  - Encourage patient/family to participate in care and decision-making at the level they choose  - Honor patient and family perspectives and choices  Outcome: Progressing     Problem: PAIN - ADULT  Goal: Verbalizes/displays adequate comfort level or patient's stated pain goal  Description: INTERVENTIONS:  - Encourage pt to monitor pain and request assistance  - Assess pain using appropriate pain scale  - Administer analgesics based on type and severity of pain and evaluate response  - Implement non-pharmacological measures as appropriate and evaluate response  - Consider cultural and social influences on pain and pain management  - Manage/alleviate anxiety  - Utilize distraction and/or relaxation techniques  - Monitor for opioid side effects  - Notify MD/LIP if interventions unsuccessful or patient reports new pain  - Anticipate increased pain with activity and pre-medicate as appropriate  Outcome: Progressing     Problem: RISK FOR INFECTION - ADULT  Goal: Absence of fever/infection during anticipated neutropenic period  Description: INTERVENTIONS  - Monitor WBC  - Administer growth factors as ordered  - Implement neutropenic guidelines  Outcome: Progressing     Problem:  SAFETY ADULT - FALL  Goal: Free from fall injury  Description: INTERVENTIONS:  - Assess pt frequently for physical needs  - Identify cognitive and physical deficits and behaviors that affect risk of falls.  - [place] fall precautions as indicated by assessment.  - Educate pt/family on patient safety including physical limitations  - Instruct pt to call for assistance with activity based on assessment  - Modify environment to reduce risk of injury  - Provide assistive devices as appropriate  - Consider OT/PT consult to assist with strengthening/mobility  - Encourage toileting schedule  Outcome: Progressing     Problem: DISCHARGE PLANNING  Goal: Discharge to home or other facility with appropriate resources  Description: INTERVENTIONS:  - Identify barriers to discharge w/pt and caregiver  - Include patient/family/discharge partner in discharge planning  - Arrange for needed discharge resources and transportation as appropriate  - Identify discharge learning needs (meds, wound care, etc)  - Arrange for interpreters to assist at discharge as needed  - Consider post-discharge preferences of patient/family/discharge partner  - Complete POLST form as appropriate  - Assess patient's ability to be responsible for managing their own health  - Refer to Case Management Department for coordinating discharge planning if the patient needs post-hospital services based on physician/LIP order or complex needs related to functional status, cognitive ability or social support system  Outcome: Progressing

## 2025-03-09 VITALS
WEIGHT: 231 LBS | TEMPERATURE: 98 F | DIASTOLIC BLOOD PRESSURE: 70 MMHG | SYSTOLIC BLOOD PRESSURE: 133 MMHG | BODY MASS INDEX: 29.65 KG/M2 | HEIGHT: 74 IN | OXYGEN SATURATION: 95 % | HEART RATE: 82 BPM | RESPIRATION RATE: 16 BRPM

## 2025-03-09 PROCEDURE — 97530 THERAPEUTIC ACTIVITIES: CPT

## 2025-03-09 RX ORDER — HYDROCODONE BITARTRATE AND ACETAMINOPHEN 10; 325 MG/1; MG/1
1 TABLET ORAL EVERY 6 HOURS PRN
Status: SHIPPED | COMMUNITY
Start: 2025-03-09

## 2025-03-09 RX ORDER — PSEUDOEPHEDRINE HCL 30 MG
100 TABLET ORAL 2 TIMES DAILY
Qty: 60 CAPSULE | Refills: 0 | Status: SHIPPED | COMMUNITY
Start: 2025-03-09

## 2025-03-09 RX ORDER — ASPIRIN 81 MG/1
81 TABLET ORAL 2 TIMES DAILY
Qty: 60 TABLET | Refills: 0 | Status: SHIPPED | COMMUNITY
Start: 2025-03-09

## 2025-03-09 NOTE — PLAN OF CARE
POD3. Dressing in place. AOX4. Room air. General diet, tolerated well. Tele monitoring, no calls. SCDS Teds ASA. Voiding freely. Passing flatus. PRN norco given for pain management. Discharging home with [facility].

## 2025-03-09 NOTE — DISCHARGE SUMMARY
[facility]  part of [facility] Discharge Summary   Patient ID:  Luis Antonio Pavon  [id number]  74 year old  3/10/1950    Admit date: 3/6/2025    Discharge date and time: 3/9/2025  2:24 PM      Attending Physician: Daya att. providers found     Primary Care Physician: Jonathon Palacio MD     Reason for admission right TKA  (see HPI on HP for further detail)    Discharged Condition: stable    Disposition: home    Risk of Readmission Lace+ Score: 29  59-90 High Risk  29-58 Medium Risk  0-28   Low Risk    Important follow up:   -duly coordinator messaged for PCP follow up  -orhto follow up as scheduled   -  Discharge meds  I reviewed and reconciled current and discharge medications on the day of discharge with the changes reflected below.       Medication List        START taking these medications      aspirin 81 MG Tbec  Take 1 tablet (81 mg total) by mouth in the morning and 1 tablet (81 mg total) before bedtime.     docusate sodium 100 MG Caps  Commonly known as: COLACE  Take 100 mg by mouth 2 (two) times daily.     HYDROcodone-acetaminophen  MG Tabs  Commonly known as: Norco     Sennosides 17.2 MG Tabs  Take 1 tablet (17.2 mg total) by mouth nightly.            CONTINUE taking these medications      Cosamin -400 MG Caps  Generic drug: Glucosamine-Chondroitin     fluticasone propionate 50 MCG/ACT Susp  Commonly known as: Flonase  SPRAY 2 SPRAYS IN EACH NOSTRIL EVERY DAY     HELIOCARE OR     ketoconazole 2 % Crea  Commonly known as: Nizoral  Apply to rash between toes BID     OCUVITE ADULT 50+ OR     Probiotic 250 MG Caps     Vitamin D 50 MCG (2000 UT) Caps     ZyrTEC 10 MG Tabs  Generic drug: cetirizine               Where to Get Your Medications        Information about where to get these medications is not yet available    Ask your nurse or doctor about these medications  aspirin 81 MG Tbec  docusate sodium 100 MG Caps  Sennosides 17.2 MG Tabs       HPI per  chart  Luis Antonio Pavon is a 74 year old male with PMH sig for GERD, known right bundle branch block, prior skin cancer with yearly skin checks who presented for right total knee arthroplasty.     Postoperatively he is doing fairly well.  He was able to ambulate with PT and OT but did not feel ready for home.  No chest pain or shortness of breath.  No nausea vomiting.  Pain so far controlled.  Able to void although it did initially take him a few hours has voided since again.  Without issue  Hospital Course  Course complicated by syncopal episode x 2.  After second episode RRT was called.  Neurowork-up including CT and CTA negative.  Echo unrevealing.  Symptoms resolved and likely vasovagal secondary to pain.  Vital stable and cleared by PT for home.  Discharge Diagnoses:   Right total knee arthroplasty  - As needed IV p.o. pain meds for postop pain control  - Monitor acute blood loss anemia as expected, postop hemoglobin stable->12.7->12.8, check PRN   - PT OT, Ortho     Syncopal episodes x 2, altered mental status  - Likely secondary to low blood pressure, suspect vasovagal due to pain  - Neurology eval, CT CTA unrevealing  - no events ont niall   -echo unrevealing  -Troponin negative and EKG with sinus rhythm, known right bundle branch block     History of right bundle branch block  - No chest pain or shortness of breath, tolerated surgery well       Consults: IP CONSULT TO SOCIAL WORK  IP CONSULT TO CASE MANAGEMENT  CONSULT TO ACUTE PAIN    Radiology: CARD ECHO 2D DOPPLER CONTRAST (CPT=93306)    Addendum Date: 3/7/2025    Transthoracic Echocardiogram (Report amended ) Name:Luis Antonio Pavon Date: 2025 :  03/10/1950 Ht:  (74in)  BP: 117 / 95 MRN:  [id number]     Age:  74years    Wt:  (231lb) HR: 75bpm Loc:  [facility]       Gndr: M          BSA: 2.31m^2 Sonographer: Thu Ordering:    Kathy Gonzalez Consulting:  Farrukh Veras --------------------------------------------------------------------------- -  History/Indications:   Syncope.  Abnormal ECG. --------------------------------------------------------------------------- - Procedure information:  A transthoracic complete 2D study was performed. Additional evaluation included M-mode, complete spectral Doppler, and color Doppler.  Patient status:  Inpatient.  Location:  Bedside.    This was a routine study. Transthoracic echocardiography for diagnosis and ventricular function evaluation. Image quality was suboptimal. The study was technically limited due to restricted patient mobility and patient supine. Intravenous contrast (Definity) was administered to evaluate left ventricular function. --------------------------------------------------------------------------- - Conclusions: 1. Left ventricle: The cavity size was normal. Wall thickness was normal.    Systolic function was normal. The estimated ejection fraction was 60%, by    visual assessment. No diagnostic evidence for regional wall motion    abnormalities. Unable to assess LV diastolic function. 2. Right ventricle: Systolic pressure was within the normal range. 3. Pulmonary arteries: Systolic pressure was within the normal range,    estimated to be 29mm Hg. Impressions:  No previous study was available for comparison. * --------------------------------------------------------------------------- - * Findings: Left ventricle:  The cavity size was normal. Wall thickness was normal. Systolic function was normal. The estimated ejection fraction was 60%, by visual assessment. No diagnostic evidence for regional wall motion abnormalities. Unable to assess LV diastolic function. Left atrium:  The left atrial volume was upper normal. Right ventricle:  Right ventricle is not well seen, appears to be normal in size and function.  Systolic pressure was within the normal range. Right atrium:  The atrium was normal in size. Mitral valve:  The valve was structurally normal. The leaflets were normal thickness. Leaflet  separation was normal.  Doppler:  Transvalvular velocity was within the normal range. There was no evidence for stenosis. There was trivial regurgitation. Aortic valve:  The valve was structurally normal. The valve was probably trileaflet. The leaflets were normal thickness. Cusp separation was normal. Doppler:  Transvalvular velocity was within the normal range. There was no evidence for stenosis. There was mild regurgitation. Tricuspid valve:  The valve is structurally normal. Leaflet separation was normal.  Doppler:  Transvalvular velocity was within the normal range. There was no evidence for stenosis. There was trivial regurgitation. Pulmonic valve:   The valve is structurally normal. Cusp separation was normal.  Doppler:  Transvalvular velocity was within the normal range. There was no evidence for stenosis. There was no significant regurgitation. Pericardium:   There was no pericardial effusion. Aorta: Aortic root: The aortic root was normal. Ascending aorta: The ascending aorta was normal. Pulmonary arteries: Systolic pressure was within the normal range, estimated to be 29mm Hg. Systemic veins: Inferior vena cava: The IVC was normal-sized. The IVC was normal-sized. Respirophasic diameter changes are in the normal range (> 50%), consistent with normal central venous pressure. --------------------------------------------------------------------------- - Measurements  Left ventricle                    Value        Ref  IVS thickness, ED, PLAX           0.9   cm     0.6 -                                                 1.0  LV ID, ED, PLAX                   4.9   cm     4.2 -                                                 5.8  LV ID, ES, PLAX                   3.2   cm     2.5 -                                                 4.0  LV PW thickness, ED, PLAX         0.9   cm     0.6 -                                                 1.0  IVS/LV PW ratio, ED, PLAX         1.00         --------  LV PW/LV ID ratio,  ED, PLAX       0.18         --------  LV ejection fraction              64    %      52 - 72  Stroke volume/bsa, 2D             39    ml/m^2 --------  LV e', lateral                (L) 6.7   cm/sec >=10.0  LV E/e', lateral              (H) 14           <=13  LV e', medial                     9.7   cm/sec >=7.0  LV E/e', medial                   9            --------  LV e', average                    8.2   cm/sec --------  LV E/e', average                  11           <=14  LVOT                              Value        Ref  LVOT ID                           2.3   cm     --------  LVOT peak velocity, S             1.13  m/sec  --------  LVOT VTI, S                       21.9  cm     --------  LVOT peak gradient, S             5     mm Hg  --------  LVOT mean gradient, S             3     mm Hg  --------  Stroke volume (SV), LVOT DP       91    ml     --------  Stroke index (SV/bsa), LVOT       39    ml/m^2 --------  DP  Aortic root                       Value        Ref  Aortic root ID                    3.7   cm     2.9 -                                                 4.4  Ascending aorta                   Value        Ref  Ascending aorta ID                3.6   cm     2.2 -                                                 3.8  Left atrium                       Value        Ref  LA volume/bsa, S                  30    ml/m^2 16 - 34  LA volume, ES, 1-p A4C        (H) 74    ml     18 - 58  LA volume, ES, 1-p A2C        (H) 64    ml     18 - 58  LA volume, ES, A/L                87    ml     --------  LA volume/bsa, ES, A/L        (H) 38    ml/m^2 16 - 34  Mitral valve                      Value        Ref  Mitral E-wave peak velocity       0.91  m/sec  --------  Mitral A-wave peak velocity       0.77  m/sec  --------  Mitral deceleration time          206   ms     --------  Mitral peak gradient, D           3     mm Hg  --------  Mitral E/A ratio, peak            1.2          --------  Pulmonary artery                   Value        Ref  PA pressure, S, DP                29    mm Hg  --------  Tricuspid valve                   Value        Ref  Tricuspid regurg peak             2.57  m/sec  <=2.8  velocity  Tricuspid peak RV-RA gradient     26    mm Hg  --------  Systemic veins                    Value        Ref  Estimated CVP                     3     mm Hg  --------  Inferior vena cava                Value        Ref  ID                                1.8   cm     <=2.1  Right ventricle                   Value        Ref  TAPSE, 2D                         2.36  cm     >=1.70  TAPSE, MM                         2.36  cm     >=1.70  RV pressure, S, DP                29    mm Hg  --------  RV s', lateral                    14.8  cm/sec >=9.5 Legend: (L)  and  (H)  nicole values outside specified reference range. --------------------------------------------------------------------------- - Kieran Castrejon 2025 17:41     Result Date: 3/7/2025  Transthoracic Echocardiogram Name:Luis Antonio Pavon Date: 2025 :  03/10/1950 Ht:  (74in)  BP: 117 / 95 MRN:  [id number]     Age:  74years    Wt:  (231lb) HR: 75bpm Loc:  [facility]       Gndr: M          BSA: 2.31m^2 Sonographer: Thu Ordering:    Kathy Gonzalez Consulting:  Farrukh Veras ---------------------------------------------------------------------------- History/Indications:   Syncope.  Abnormal ECG. ---------------------------------------------------------------------------- Procedure information:  A transthoracic complete 2D study was performed. Additional evaluation included M-mode, complete spectral Doppler, and color Doppler.  Patient status:  Inpatient.  Location:  Bedside.    This was a routine study. Transthoracic echocardiography for diagnosis and ventricular function evaluation. Image quality was suboptimal. The study was technically limited due to restricted patient mobility and patient supine. Intravenous contrast (Definity) was administered to evaluate  left ventricular function. ---------------------------------------------------------------------------- Conclusions: 1. Left ventricle: The cavity size was normal. Wall thickness was normal.    Systolic function was normal. The estimated ejection fraction was 60-65%,    by visual assessment. No diagnostic evidence for regional wall motion    abnormalities. Unable to assess LV diastolic function. 2. Right ventricle: Right ventricle is not well seen, appears to be normal    in size and function. Systolic pressure was within the normal range. 3. Left atrium: The left atrial volume was normal. 4. Right atrium: The atrium was normal in size. 5. Pulmonary arteries: Systolic pressure was within the normal range,    estimated to be 29mm Hg. 6. Inferior vena cava: The IVC was normal-sized. Respirophasic diameter    changes are in the normal range (> 50%), consistent with normal central    venous pressure. Impressions:  No previous study was available for comparison. * ---------------------------------------------------------------------------- * Findings: Left ventricle:  The cavity size was normal. Wall thickness was normal. Systolic function was normal. The estimated ejection fraction was 60-65%, by visual assessment. No diagnostic evidence for regional wall motion abnormalities. Unable to assess LV diastolic function. Left atrium:  The left atrial volume was normal. Right ventricle:  Right ventricle is not well seen, appears to be normal in size and function.  Systolic pressure was within the normal range. Right atrium:  The atrium was normal in size. Mitral valve:  The valve was structurally normal. The leaflets were normal thickness. Leaflet separation was normal.  Doppler:  Transvalvular velocity was within the normal range. There was no evidence for stenosis. There was trivial regurgitation. Aortic valve:  The valve was structurally normal. The valve was probably trileaflet. The leaflets were normal thickness. Cusp  separation was normal. Doppler:  Transvalvular velocity was within the normal range. There was no evidence for stenosis. There was mild regurgitation. Tricuspid valve:  The valve is structurally normal. Leaflet separation was normal.  Doppler:  Transvalvular velocity was within the normal range. There was no evidence for stenosis. There was trivial regurgitation. Pulmonic valve:   The valve is structurally normal. Cusp separation was normal.  Doppler:  Transvalvular velocity was within the normal range. There was no evidence for stenosis. There was no significant regurgitation. Pericardium:   There was no pericardial effusion. Aorta: Aortic root: The aortic root was normal. Ascending aorta: The ascending aorta was normal. Pulmonary arteries: Systolic pressure was within the normal range, estimated to be 29mm Hg. Systemic veins: Inferior vena cava: The IVC was normal-sized. The IVC was normal-sized. Respirophasic diameter changes are in the normal range (> 50%), consistent with normal central venous pressure. ---------------------------------------------------------------------------- Measurements  Left ventricle                    Value        Ref  IVS thickness, ED, PLAX           0.9   cm     0.6 -                                                 1.0  LV ID, ED, PLAX                   4.9   cm     4.2 -                                                 5.8  LV ID, ES, PLAX                   3.2   cm     2.5 -                                                 4.0  LV PW thickness, ED, PLAX         0.9   cm     0.6 -                                                 1.0  IVS/LV PW ratio, ED, PLAX         1.00         --------  LV PW/LV ID ratio, ED, PLAX       0.18         --------  LV ejection fraction              64    %      52 - 72  Stroke volume/bsa, 2D             39    ml/m^2 --------  LV e', lateral                (L) 6.7   cm/sec >=10.0  LV E/e', lateral              (H) 14           <=13  LV e', medial                      9.7   cm/sec >=7.0  LV E/e', medial                   9            --------  LV e', average                    8.2   cm/sec --------  LV E/e', average                  11           <=14  LVOT                              Value        Ref  LVOT ID                           2.3   cm     --------  LVOT peak velocity, S             1.13  m/sec  --------  LVOT VTI, S                       21.9  cm     --------  LVOT peak gradient, S             5     mm Hg  --------  LVOT mean gradient, S             3     mm Hg  --------  Stroke volume (SV), LVOT DP       91    ml     --------  Stroke index (SV/bsa), LVOT       39    ml/m^2 --------  DP  Aortic root                       Value        Ref  Aortic root ID                    3.7   cm     2.9 -                                                 4.4  Ascending aorta                   Value        Ref  Ascending aorta ID                3.6   cm     2.2 -                                                 3.8  Left atrium                       Value        Ref  LA volume/bsa, S                  30    ml/m^2 16 - 34  LA volume, ES, 1-p A4C        (H) 74    ml     18 - 58  LA volume, ES, 1-p A2C        (H) 64    ml     18 - 58  LA volume, ES, A/L                87    ml     --------  LA volume/bsa, ES, A/L        (H) 38    ml/m^2 16 - 34  Mitral valve                      Value        Ref  Mitral E-wave peak velocity       0.91  m/sec  --------  Mitral A-wave peak velocity       0.77  m/sec  --------  Mitral deceleration time          206   ms     --------  Mitral peak gradient, D           3     mm Hg  --------  Mitral E/A ratio, peak            1.2          --------  Pulmonary artery                  Value        Ref  PA pressure, S, DP                29    mm Hg  --------  Tricuspid valve                   Value        Ref  Tricuspid regurg peak             2.57  m/sec  <=2.8  velocity  Tricuspid peak RV-RA gradient     26    mm Hg  --------  Systemic veins                     Value        Ref  Estimated CVP                     3     mm Hg  --------  Inferior vena cava                Value        Ref  ID                                1.8   cm     <=2.1  Right ventricle                   Value        Ref  TAPSE, 2D                         2.36  cm     >=1.70  TAPSE, MM                         2.36  cm     >=1.70  RV pressure, S, DP                29    mm Hg  --------  RV s', lateral                    14.8  cm/sec >=9.5 Legend: (L)  and  (H)  nicole values outside specified reference range. ---------------------------------------------------------------------------- Prepared and electronically signed by Jewel Bazan 03/07/2025 17:15     CT STROKE CTA BRAIN/CTA NECK (W IV)(CPT=70496/87259)    Result Date: 3/7/2025  PROCEDURE: CT STROKE CTA BRAIN/CTA NECK (WIV) (CPT=70496/02303)  COMPARISON: None.  INDICATIONS: ams, syncope, aphasic  TECHNIQUE: CT images of the neck and brain were obtained with non-ionic intravenous contrast material. Multi-planar reformatted and 3-D images were created with vessel analysis performed on an independent workstation. Automated exposure control for dose reduction was used. Evaluation of internal carotid stenosis is based on NASCET Criteria.    FINDINGS: CT ANGIOGRAPHY OF THE NECK:  CAROTID ARTERIES: RIGHT COMMON CAROTID: No hemodynamically significant stenosis or dissection. RIGHT INTERNAL CAROTID: Mild atherosclerosis bifurcation.  No hemodynamically significant stenosis or dissection.  LEFT COMMON CAROTID: No hemodynamically significant stenosis or dissection. LEFT INTERNAL CAROTID: Mild atherosclerosis bifurcation.  No hemodynamically significant stenosis or dissection.  VERTEBRAL ARTERIES: RIGHT VERTEBRAL ARTERY: No hemodynamically significant stenosis or dissection. LEFT VERTEBRAL ARTERY: No hemodynamically significant stenosis or dissection.  CT ANGIOGRAPHY OF THE BRAIN:  ANTERIOR CIRCULATION: DISTAL INTERNAL CAROTIDS: Flow identified. No significant  stenosis. ANTERIOR CEREBRALS: Flow identified. No significant stenosis. MIDDLE CEREBRALS: Flow identified. No significant stenosis.  POSTERIOR CIRCULATION: RIGHT VERTEBRAL: Flow identified. No significant stenosis. LEFT VERTEBRAL: Flow identified. No significant stenosis. BASILAR: Flow identified. No significant stenosis. POSTERIOR CEREBRALS: Flow identified. No significant stenosis.  ANEURYSMS: None. VASCULAR MALFORMATIONS: None. OTHER: Negative.     AORTIC ARCH (Limited): No aneurysm or dissection is identified in the imaged volume.  MEDIASTINUM/APICES (Limited): No mass or adenopathy.  OTHER:  Visualized neck soft tissues grossly unremarkable.  Multilevel cervical spine degenerative change.          CONCLUSION:   No evidence of intracranial large vessel arterial occlusion or proximal flow limiting stenosis.  No evidence of occlusion, hemodynamically significant stenosis or evidence to suggest dissection of the bilateral cervical carotid and vertebral arteries.  Findings were discussed with the patient's RN Alvarez at 11:27 a.m. 03/07/2025.    Dictated by (CST): Johnny Martinez MD on 3/07/2025 at 11:21 AM     Finalized by (CST): Johnny Martinez MD on 3/07/2025 at 11:27 AM          CT STROKE BRAIN (NO IV)(CPT=70450)    Result Date: 3/7/2025  PROCEDURE: CT STROKE BRAIN (NO IV) (CPT=70450)  COMPARISON: None.  INDICATIONS: AMS, syncope  TECHNIQUE:   CT images were obtained without contrast material.  Automated exposure control for dose reduction was used.  Dose information is transmitted to the ACR (American College of Radiology) NRDR (National Radiology Data Registry) which includes the Dose Index Registry.  FINDINGS:  CSF SPACES: Ventricles, cisterns, and sulci are appropriate for age.  No hydrocephalus, subarachnoid hemorrhage, or mass.  No midline shift.  Cavum septum pellucidum Advair again noted. CEREBRUM: No edema, hemorrhage, mass, acute infarction, or significant atrophy.  WHITE MATTER: Moderate decreased  attenuation bilateral white matter likely chronic microvascular white matter ischemia. CEREBELLUM: No edema, hemorrhage, mass, acute infarction, or significant atrophy.  BRAINSTEM: No edema, hemorrhage, mass, acute infarction, or significant atrophy.  CALVARIUM: No apparent fracture, mass, or other significant visible lesion.  SINUSES: Consult thickening of the paranasal sinuses particularly the ethmoid and sphenoid sinuses noted with aerated secretions. ORBITS: Prior bilateral cataract surgery. OTHER: Negative.          CONCLUSION:   No evidence of acute intracranial abnormality.  Moderate chronic microvascular white matter ischemia.  Slight thickening with aerated secretions ethmoid and sphenoid sinuses which can be seen with sinusitis.  Findings were discussed with patient's RN Alvarez at 11:18 a.m. 03/07/2025.    Dictated by (CST): Johnny Martinez MD on 3/07/2025 at 11:15 AM     Finalized by (CST): Johnny Martinez MD on 3/07/2025 at 11:19 AM          XR KNEE (1 OR 2 VIEWS), RIGHT (CPT=73560)    Result Date: 3/6/2025  PROCEDURE: XR KNEE (1 OR 2 VIEWS), RIGHT (CPT=73560)  COMPARISON: None.  INDICATIONS: s/p right tka  TECHNIQUE: 2 views were obtained.   FINDINGS:   The patient is status post recent right knee arthroplasty with femoral, tibial, and patellar components in expected configuration.  There is edema and subcutaneous emphysema in the surrounding soft tissues compatible with recent postsurgical state.         CONCLUSION: Expected postsurgical changes status post recent right knee arthroplasty.    Dictated by (CST): Javi Renteria MD on 3/06/2025 at 10:27 AM     Finalized by (CST): Javi Renteria MD on 3/06/2025 at 10:27 AM             Operative Procedures: Procedure(s) (LRB):  Right total knee arthroplasty (Right)     Day of discharge well with PT today.  No events on telemetry.  No further syncopal episodes    Exam  Vitals:    03/09/25 0820   BP: 133/70   Pulse: 82   Resp: 16   Temp: 98.4 °F (36.9 °C)      No acute distress, alert and orientedx3  Lungs Clear  Heart Regular  Abdomen Benign    Total Time Coordinating Care: > than 30 minutes    Note: This chart was prepared using voice recognition software and may contain unintended word substitution errors.     Patient had opportunity to ask questions and state understand and agree with therapeutic plan as outlined

## 2025-03-09 NOTE — PLAN OF CARE
Patient POD#3. A&Ox4. RA. Remote tele monitoring. SCD' and ASA for DVT prophylaxis. Tolerating diet. Voiding with urinal. Denies nausea. Gel ice wrap on as needed. Prn norco given for pain control. X1 with walker. Bed locked and lowered. Call light within reach. Frequent rounding. Home with residential c pending PT.     Problem: Patient Centered Care  Goal: Patient preferences are identified and integrated in the patient's plan of care  Description: Interventions:  - What would you like us to know as we care for you?   - Provide timely, complete, and accurate information to patient/family  - Incorporate patient and family knowledge, values, beliefs, and cultural backgrounds into the planning and delivery of care  - Encourage patient/family to participate in care and decision-making at the level they choose  - Honor patient and family perspectives and choices  Outcome: Progressing     Problem: Patient/Family Goals  Goal: Patient/Family Long Term Goal  Description: Patient's Long Term Goal:     Interventions:  -   - See additional Care Plan goals for specific interventions  Outcome: Progressing  Goal: Patient/Family Short Term Goal  Description: Patient's Short Term Goal:     Interventions:   -   - See additional Care Plan goals for specific interventions  Outcome: Progressing     Problem: PAIN - ADULT  Goal: Verbalizes/displays adequate comfort level or patient's stated pain goal  Description: INTERVENTIONS:  - Encourage pt to monitor pain and request assistance  - Assess pain using appropriate pain scale  - Administer analgesics based on type and severity of pain and evaluate response  - Implement non-pharmacological measures as appropriate and evaluate response  - Consider cultural and social influences on pain and pain management  - Manage/alleviate anxiety  - Utilize distraction and/or relaxation techniques  - Monitor for opioid side effects  - Notify MD/LIP if interventions unsuccessful or patient reports new  pain  - Anticipate increased pain with activity and pre-medicate as appropriate  Outcome: Progressing     Problem: RISK FOR INFECTION - ADULT  Goal: Absence of fever/infection during anticipated neutropenic period  Description: INTERVENTIONS  - Monitor WBC  - Administer growth factors as ordered  - Implement neutropenic guidelines  Outcome: Progressing     Problem: SAFETY ADULT - FALL  Goal: Free from fall injury  Description: INTERVENTIONS:  - Assess pt frequently for physical needs  - Identify cognitive and physical deficits and behaviors that affect risk of falls.  - Alexis fall precautions as indicated by assessment.  - Educate pt/family on patient safety including physical limitations  - Instruct pt to call for assistance with activity based on assessment  - Modify environment to reduce risk of injury  - Provide assistive devices as appropriate  - Consider OT/PT consult to assist with strengthening/mobility  - Encourage toileting schedule  Outcome: Progressing     Problem: DISCHARGE PLANNING  Goal: Discharge to home or other facility with appropriate resources  Description: INTERVENTIONS:  - Identify barriers to discharge w/pt and caregiver  - Include patient/family/discharge partner in discharge planning  - Arrange for needed discharge resources and transportation as appropriate  - Identify discharge learning needs (meds, wound care, etc)  - Arrange for interpreters to assist at discharge as needed  - Consider post-discharge preferences of patient/family/discharge partner  - Complete POLST form as appropriate  - Assess patient's ability to be responsible for managing their own health  - Refer to Case Management Department for coordinating discharge planning if the patient needs post-hospital services based on physician/LIP order or complex needs related to functional status, cognitive ability or social support system  Outcome: Progressing

## 2025-03-09 NOTE — CM/SW NOTE
03/09/25 1100   Discharge disposition   Expected discharge disposition Home-Health   Post Acute Care Provider Residential   Discharge transportation Private car     The patient received a MDO for discharge.    The [facility] with [facility].    The patient will be transported home via private car.    TERRI/GINNY to remain available for support and/or discharge planning.     Justine Ervin MSW, LSW  Discharge Planner [id number]

## 2025-03-09 NOTE — PHYSICAL THERAPY NOTE
PHYSICAL THERAPY KNEE TREATMENT NOTE - INPATIENT     Room Number: 418/418-A             Presenting Problem: s/p R TKA  Co-Morbidities :  Cancer (HCC)    skin cancer   Chondromalacia of patella    Log Date: 02/18/2013    Colon polyp   Dislocation of shoulder    bilat mult times, sees ortho prn   ED (erectile dysfunction)   GERD (gastroesophageal reflux disease)    uses tums 2-3 times weekly, fermin after acidic foods. no trouble swallowing   Hematuria    work up by Dr. Caraballo negative   Hemorrhoids    better with surgery   History of bilateral inguinal hernia repair    saw Sapna, surgery fall 2016   Living will in place   Low HDL (under 40)   Nasal congestion    saw ENT   Need for hepatitis C screening test    neg   Normal eye exam    at [facility], will go 2021   Osteoarthritis   RBBB    new from 2010, asyptomatic, no further w/u indicated,  discussed with pt ( was nl 2/22/10)   Screening for cardiovascular condition    normal CGXT with EF of 52%   Screening for skin condition    Dr. Sena, sees yearly    Problem List  Active Problems:    S/P total knee arthroplasty, right    Preop testing      PHYSICAL THERAPY ASSESSMENT   Patient demonstrates good  progress this session, goals  remain in progress.      Patient is requiring minimal assist as a result of the following impairments: decreased functional strength, decreased endurance/aerobic capacity, pain, and decreased muscular endurance.     Patient continues to function below baseline with bed mobility, transfers, gait, stair negotiation, maintaining seated position, and standing prolonged periods.  Next session anticipate patient to progress bed mobility, transfers, gait, stair negotiation, maintaining seated position, and standing prolonged periods.  Physical Therapy will continue to follow patient for duration of hospitalization.    Patient continues to benefit from continued skilled PT services: at discharge to promote prior level of function and safety with additional  support and return home with home health PT.    PLAN DURING HOSPITALIZATION  Nursing Mobility Recommendation : 1 Assist     PT Treatment Plan: Bed mobility, Body mechanics, Coordination, Endurance, Energy conservation, Patient education, Family education, Gait training, Range of motion, Strengthening, Stoop training, Stair training, Transfer training, Balance training  Frequency (Obs): Daily     SUBJECTIVE  Pt was agreeable to therapy session.         OBJECTIVE  Precautions: Bed/chair alarm    WEIGHT BEARING STATUS  R Lower Extremity: Weight Bearing as Tolerated      PAIN ASSESSMENT   Ratin  Location: R knee  Management Techniques: Activity promotion, Body mechanics, Relaxation, Repositioning    BALANCE  Static Sitting: Fair +  Dynamic Sitting: Fair  Static Standing: Fair -  Dynamic Standing: Fair -    ACTIVITY TOLERANCE                         O2 WALK       AM-PAC '6-Clicks' INPATIENT SHORT FORM - BASIC MOBILITY  How much difficulty does the patient currently have...  Patient Difficulty: Turning over in bed (including adjusting bedclothes, sheets and blankets)?: A Little   Patient Difficulty: Sitting down on and standing up from a chair with arms (e.g., wheelchair, bedside commode, etc.): A Little   Patient Difficulty: Moving from lying on back to sitting on the side of the bed?: A Little   How much help from another person does the patient currently need...   Help from Another: Moving to and from a bed to a chair (including a wheelchair)?: A Little   Help from Another: Need to walk in hospital room?: A Little   Help from Another: Climbing 3-5 steps with a railing?: A Little     AM-PAC Score:  Raw Score: 18   Approx Degree of Impairment: 46.58%   Standardized Score (AM-PAC Scale): 43.63   CMS Modifier (G-Code): CK    FUNCTIONAL ABILITY STATUS  Functional Mobility/Gait Assessment  Gait Assistance: Supervision, Contact guard assist  Distance (ft): 250'  Assistive Device: Rolling walker  Pattern: R Decreased  stance time  Stairs: Stairs  How Many Stairs: 8  Device: 2 Rails, 1 Rail  Assist: Contact guard assist  Pattern: Ascend and Descend  Ascend and Descend : Step to  Rolling: minimal assist  Supine to Sit: minimal assist  Sit to Supine:  NT  Sit to Stand: contact guard assist and minimal assist    Skilled Therapy Provided: Pt is received in the bed and was cleared for therapy session. Pt is min A with his R LE to transfer to the EOB. Pt required slow movements and extra time to perform. Pt sat EOB for a few minutes and denied any dizziness and light headedness. Pt is min/CGA with sit<>stand transfers with the RW. Pt was cued for proper hand placement for safety. Pt was able to AMB about 250' with the RW CGA/SBA for balance and safety. Pt with decreased shelia and step length with narrow SRINI but with very good balance and safety awareness. Pt was brought to the therapy gym for stairs training. Pt was educated and able to negotiate 4 stairs with 2 HR's CGA and 4 stairs with 1 HR CGA for balance and safety. Pt was cued for proper technique and sequencing. Pt with good balance on the stairs. Returned pt back to the room and to sitting in the chair with all needs within reach. Educated and pt performed exercises on R LE to increase strength/ROM to improve functional mobility. Pt is cued for proper form. HEP handout was also issued. Discussed  and educated car transfers and home dc with the pt and his spouse. Pt is on track to dc to home once medically cleared. Reported to the RN on the status of the pt.     The patient's Approx Degree of Impairment: 46.58% has been calculated based on documentation in the [facility] '6 clicks' Inpatient Basic Mobility Short Form.  Research supports that patients with this level of impairment may benefit from Home with C and assist.  Final disposition will be made by interdisciplinary medical team.    Exercises AM Session   Sitting Knee Flexion 10 reps         Knee ROM       Patient End of  Session: Up in chair, Needs met, Call light within reach, RN aware of session/findings, All patient questions and concerns addressed, Hospital anti-slip socks, Family present    CURRENT GOALS  Goals to be met by: 3/20/25  Patient Goal Patient's self-stated goal is: not stated   Goal #1 Patient is able to demonstrate supine - sit EOB @ level: modified independent      Goal #1   Current Status  min A with the R LE   Goal #2 Patient is able to demonstrate transfers Sit to/from Stand at assistance level: modified independent      Goal #2  Current Status  CGA/min A with RW    Goal #3 Patient is able to ambulate 300 feet with assistive device at assistance level: modified independent    Goal #3   Current Status  250' with the SBA    Goal #4 Patient will negotiate 16 stairs/one curb w/ assistive device and supervision   Goal #4   Current Status  8 stairs with 1-2 HR's CGA   Goal #5  AROM 0 degrees extension to 95 degrees flexion     Goal #5   Current Status IN PROGRESS   Goal #6 Patient independently performs home exercise program for ROM/strengthening per the instructions provided in preparation for discharge.   Goal #6  Current Status Educated and performed and issued HEP handout and reviewed         Therapeutic Activity: 45 minutes

## 2025-03-09 NOTE — PROGRESS NOTES
[facility]  Progress Note    Luis Antonio Pavon Patient Status:  Outpatient in a Bed    3/10/1950 MRN [id number]   Location [facility] 4W/SW/SE Attending Angel Smith MD   Hosp Day # 0 PCP Jonathon Palacio MD     SUBJECTIVE:  Interval History: S/p Right total knee arthroplasty with Dr. Smith POD 3. Patient has no current complaints. He is up to the bathroom with 2 assist. He states his pain is well managed with the Norco.  Patient denies chest pain, shortness of breath and calf pain.    Post-Op Day: 3    OBJECTIVE:  Blood pressure 139/83, pulse 77, temperature 98.6 °F (37 °C), temperature source Temporal, resp. rate 16, height 6' 2\" (1.88 m), weight 231 lb (104.8 kg), SpO2 95%.     Ortho Exam:  Dressing is clean and dry.  2+ dorsalis pedis and posterior tibialis pulses.  Sensation is intact distally.  + DF/PF/EHL.  Calf is soft and nontender. Negative Marifer's test.        Data Review:  Recent Labs   Lab 25  0457 25  1135 25  0500   RBC  --  3.77* 4.02   HGB 13.5 12.7* 12.8*   HCT  --  34.9* 36.8*   MCV  --  92.6 91.5   MCH  --  33.7 31.8   MCHC  --  36.4 34.8   RDW  --  12.7 12.8   NEPRELIM  --  4.03 3.91   WBC  --  6.4 6.4   PLT  --  120.0* 103.0*         Postop XR reviewed.    Narrative   PROCEDURE: XR KNEE (1 OR 2 VIEWS), RIGHT (CPT=73560)     COMPARISON: None.     INDICATIONS: s/p right tka     TECHNIQUE: 2 views were obtained.       FINDINGS:     The patient is status post recent right knee arthroplasty with femoral, tibial, and patellar components in expected configuration.     There is edema and subcutaneous emphysema in the surrounding soft tissues compatible with recent postsurgical state.       ASSESSMENT/PLAN:  S/p RTKA POD 3  1) Continue pain control with Norco 10 q 6 hours PRN  2) Continue DVT prophylaxis with ASA 81mg BID  3) Continue PT/OT  4) Dispo: Pending PT/OT and medically stable    JORGE Porter

## (undated) DEVICE — Device: Brand: STABLECUT®

## (undated) DEVICE — BANDAGE,GAUZE,BULKEE II,4.5"X4.1YD,STRL: Brand: MEDLINE

## (undated) DEVICE — HOOD: Brand: FLYTE

## (undated) DEVICE — ENCORE® LATEX MICRO SIZE 7.5, STERILE LATEX POWDER-FREE SURGICAL GLOVE: Brand: ENCORE

## (undated) DEVICE — COTTON UNDERCAST PADDING,REGULAR FINISH: Brand: WEBRIL

## (undated) DEVICE — BLADE RMR 46MM PAT SS W/ PILOT H

## (undated) DEVICE — SUT ETHBND XL 1 30IN OS-8 NABSRB GRN 40MM 1/2

## (undated) DEVICE — SOLUTION IRRIG 1000ML 0.9% NACL USP BTL

## (undated) DEVICE — SLIM BODY SKIN STAPLER: Brand: APPOSE ULC

## (undated) DEVICE — SCREW ORTH 2.5X25MM FEM HEX PERSONA

## (undated) DEVICE — DRESSING SUR 9X25CM SIL SP CVR WTRPRF VIR

## (undated) DEVICE — KIT NAVITRACKER KNEE AND SPINE DISP ORTHOSOFT

## (undated) DEVICE — PIN DRL 75MM HDLSS TRCR NXGN

## (undated) DEVICE — GAUZE,SPONGE,4"X4",12PLY,WOVEN,NS,LF: Brand: MEDLINE

## (undated) DEVICE — DISPOSABLE TOURNIQUET CUFF SINGLE BLADDER, DUAL PORT AND QUICK CONNECT CONNECTOR: Brand: COLOR CUFF

## (undated) DEVICE — PACK CDS TOTAL KNEE

## (undated) DEVICE — SOLUTION IRRIG 3000ML 0.9% NACL FLX CONT

## (undated) DEVICE — SCREW FIX 3.5X48MM HEX HD

## (undated) DEVICE — DRAPE ROSA ROBOTIC UN

## (undated) DEVICE — 60 ML SYRINGE LUER-LOCK TIP: Brand: MONOJECT

## (undated) DEVICE — WRAP COOLING KNEE W/ICE PILLOW

## (undated) DEVICE — PAD PERINL PST FOAM DISP FOR AMSCO SURG TBL

## (undated) DEVICE — PIN FIX L80MM DIA3.2MM FLUT CAS ORTHOSOFT

## (undated) DEVICE — PIN FIX 3.2X150MM FLUT CAS ORTHOSOFT

## (undated) DEVICE — APPLICATOR PREP 26ML CHG 2% ISO ALC 70%

## (undated) DEVICE — GAMMEX® PI HYBRID SIZE 7, STERILE POWDER-FREE SURGICAL GLOVE, POLYISOPRENE AND NEOPRENE BLEND: Brand: GAMMEX

## (undated) DEVICE — 3M™ COBAN™ NL STERILE NON-LATEX SELF-ADHERENT WRAP, 2084S, 4 IN X 5 YD (10 CM X 4,5 M), 18 ROLLS/CASE: Brand: 3M™ COBAN™

## (undated) DEVICE — NEEDLE SPNL 18GA L3.5IN W/ QNCKE SHARPER BVL

## (undated) DEVICE — TOWEL,OR,DSP,ST,BLUE,DLX,2/PK,40PK/CS: Brand: MEDLINE

## (undated) DEVICE — 3M™ IOBAN™ 2 ANTIMICROBIAL INCISE DRAPE 6651EZ: Brand: IOBAN™ 2

## (undated) DEVICE — ANTIBACTERIAL UNDYED BRAIDED (POLYGLACTIN 910), SYNTHETIC ABSORBABLE SUTURE: Brand: COATED VICRYL

## (undated) DEVICE — SHEET,DRAPE,53X77,STERILE: Brand: MEDLINE

## (undated) NOTE — LETTER
[place] [address]  Authorization for Invasive Procedures  Date: ***           Time: ***    {[facility] ivs consent:19298}